# Patient Record
Sex: MALE | Race: WHITE | NOT HISPANIC OR LATINO | Employment: OTHER | URBAN - NONMETROPOLITAN AREA
[De-identification: names, ages, dates, MRNs, and addresses within clinical notes are randomized per-mention and may not be internally consistent; named-entity substitution may affect disease eponyms.]

---

## 2017-03-20 ENCOUNTER — TELEPHONE (OUTPATIENT)
Dept: INTERNAL MEDICINE | Facility: CLINIC | Age: 57
End: 2017-03-20

## 2017-03-20 NOTE — TELEPHONE ENCOUNTER
Tried to call pt to see if he wanted to reschedule est care appt with Dr. Mckee. No vmx set up on his phone, unable to leave message.

## 2018-04-25 ENCOUNTER — OFFICE VISIT (OUTPATIENT)
Dept: GASTROENTEROLOGY | Facility: CLINIC | Age: 58
End: 2018-04-25

## 2018-04-25 VITALS — RESPIRATION RATE: 18 BRPM | HEIGHT: 66 IN | BODY MASS INDEX: 37.45 KG/M2 | TEMPERATURE: 98 F | WEIGHT: 233 LBS

## 2018-04-25 DIAGNOSIS — K59.00 CONSTIPATION, UNSPECIFIED CONSTIPATION TYPE: Primary | ICD-10-CM

## 2018-04-25 DIAGNOSIS — Z12.11 COLON CANCER SCREENING: ICD-10-CM

## 2018-04-25 PROCEDURE — 99203 OFFICE O/P NEW LOW 30 MIN: CPT | Performed by: NURSE PRACTITIONER

## 2018-04-25 RX ORDER — ONDANSETRON 4 MG/1
4 TABLET, FILM COATED ORAL EVERY 8 HOURS PRN
COMMUNITY
End: 2021-03-23

## 2018-04-25 RX ORDER — SPIRONOLACTONE 25 MG/1
25 TABLET ORAL 2 TIMES DAILY
COMMUNITY
End: 2021-03-23

## 2018-04-25 RX ORDER — AMLODIPINE BESYLATE 10 MG/1
10 TABLET ORAL DAILY
COMMUNITY

## 2018-04-25 RX ORDER — CLONIDINE HYDROCHLORIDE 0.2 MG/1
0.2 TABLET ORAL ONCE
COMMUNITY
End: 2021-12-03

## 2018-04-25 RX ORDER — POTASSIUM CHLORIDE 1500 MG/1
20 TABLET, FILM COATED, EXTENDED RELEASE ORAL DAILY
COMMUNITY

## 2018-04-25 RX ORDER — DICYCLOMINE HCL 20 MG
20 TABLET ORAL AS NEEDED
COMMUNITY
End: 2021-12-03

## 2018-04-25 NOTE — PATIENT INSTRUCTIONS
1. High fiber diet with liberal water intake.   2. Colonoscopy: Description of the procedure, risks, benefits, alternatives and options, including nonoperative options, were discussed with the patient in detail. The patient understands and wishes wait at this time. He has been going to [place], [place] for his previous colonoscopies and he is unsure if he wishes to transfer care to this office. He will discuss with his PCP and call us back.

## 2018-04-25 NOTE — PROGRESS NOTES
Chief Complaint   Patient presents with   • Colon Cancer Screening   • Constipation     There is a long-standing history of constipation. The patient takes Miralax daily with good improvement of constipation. The patient will have at least 1 soft stool daily or every other day as long as he takes Miralax. If he does not take the MIralax, he may have a hard bowel movement every 3 days or so. There is no history of diarrhea. The patient denies bright red blood per rectum or melena.    The patient's last colonoscopy was in 2012 or 2013. There is no family history of colon cancer.    Constipation   This is a chronic problem. Episode onset: over 10 years. The problem is unchanged. His stool frequency is 4 to 5 times per week. The stool is described as formed and firm. The patient is not on a high fiber diet. There has not been adequate water intake. Associated symptoms include back pain. Pertinent negatives include no abdominal pain, diarrhea, fever, hematochezia, melena, nausea or vomiting. Treatments tried: Miralax. The treatment provided significant relief.      Review of Systems   Constitutional: Positive for fatigue and unexpected weight change. Negative for appetite change, chills and fever.   HENT: Negative for mouth sores, nosebleeds and trouble swallowing.    Eyes: Negative for discharge and redness.   Respiratory: Positive for apnea and shortness of breath. Negative for cough.    Cardiovascular: Negative for chest pain, palpitations and leg swelling.   Gastrointestinal: Positive for constipation. Negative for abdominal distention, abdominal pain, anal bleeding, blood in stool, diarrhea, hematochezia, melena, nausea and vomiting.   Endocrine: Negative for cold intolerance, heat intolerance and polydipsia.   Genitourinary: Negative for dysuria, hematuria and urgency.   Musculoskeletal: Positive for arthralgias, back pain and joint swelling. Negative for myalgias.   Skin: Negative for rash.   Allergic/Immunologic:  "Negative for food allergies and immunocompromised state.   Neurological: Negative for dizziness, seizures, syncope and headaches.   Hematological: Negative for adenopathy. Does not bruise/bleed easily.   Psychiatric/Behavioral: Negative for dysphoric mood. The patient is not nervous/anxious and is not hyperactive.      Patient Active Problem List   Diagnosis   • Constipation     Past Medical History:   Diagnosis Date   • Anxiety    • Arthritis    • Back pain    • Depression    • Diabetes    • GERD (gastroesophageal reflux disease)    • HTN (hypertension)    • Sleep apnea      Past Surgical History:   Procedure Laterality Date   • BACK SURGERY     • CHOLECYSTECTOMY  2014   • COLONOSCOPY  2012   • SINUS SURGERY     • TOTAL HIP ARTHROPLASTY      RIGHT     Family History   Problem Relation Age of Onset   • Breast cancer Mother    • Hypertension Father    • Ulcers Paternal Grandmother    • Colon cancer Neg Hx      Social History   Substance Use Topics   • Smoking status: Never Smoker   • Smokeless tobacco: Never Used   • Alcohol use No       Current Outpatient Prescriptions:   •  amLODIPine (NORVASC) 10 MG tablet, Take 10 mg by mouth Daily., Disp: , Rfl:   •  CloNIDine (CATAPRES) 0.2 MG tablet, Take 0.2 mg by mouth 1 (One) Time., Disp: , Rfl:   •  dicyclomine (BENTYL) 20 MG tablet, Take 20 mg by mouth As Needed., Disp: , Rfl:   •  metFORMIN (GLUCOPHAGE) 500 MG tablet, Take 500 mg by mouth 2 (Two) Times a Day With Meals., Disp: , Rfl:   •  ondansetron (ZOFRAN) 4 MG tablet, Take 4 mg by mouth Every 8 (Eight) Hours As Needed for Nausea or Vomiting., Disp: , Rfl:   •  potassium chloride ER (K-TAB) 20 MEQ tablet controlled-release ER tablet, Take 20 mEq by mouth Daily., Disp: , Rfl:   •  spironolactone (ALDACTONE) 25 MG tablet, Take 25 mg by mouth 2 (Two) Times a Day., Disp: , Rfl:     Allergies   Allergen Reactions   • Codeine Nausea And Vomiting     Temp 98 °F (36.7 °C)   Resp 18   Ht 167.6 cm (66\")   Wt 106 kg (233 lb)  "  BMI 37.61 kg/m²     Physical Exam   Constitutional: He is oriented to person, place, and time. He appears well-developed and well-nourished. No distress.   HENT:   Head: Normocephalic and atraumatic.   Right Ear: Hearing and external ear normal.   Left Ear: Hearing and external ear normal.   Nose: Nose normal.   Mouth/Throat: Oropharynx is clear and moist and mucous membranes are normal. Mucous membranes are not pale, not dry and not cyanotic. No oral lesions. No oropharyngeal exudate.   Eyes: Conjunctivae and EOM are normal. Right eye exhibits no discharge. Left eye exhibits no discharge.   Neck: Trachea normal. Neck supple. No JVD present. No edema present. No thyroid mass and no thyromegaly present.   Cardiovascular: Normal rate, regular rhythm, S2 normal and normal heart sounds.  Exam reveals no gallop, no S3 and no friction rub.    No murmur heard.  Pulmonary/Chest: Effort normal and breath sounds normal. No respiratory distress. He exhibits no tenderness.   Abdominal: Normal appearance and bowel sounds are normal. He exhibits no distension, no ascites and no mass. There is no splenomegaly or hepatomegaly. There is no tenderness. There is no rigidity, no rebound and no guarding. No hernia.     Vascular Status -  His right foot exhibits no edema. His left foot exhibits no edema.  Lymphadenopathy:     He has no cervical adenopathy.        Left: No supraclavicular adenopathy present.   Neurological: He is alert and oriented to person, place, and time. He has normal strength. No cranial nerve deficit or sensory deficit.   Skin: No rash noted. He is not diaphoretic. No cyanosis. No pallor. Nails show no clubbing.   Psychiatric: He has a normal mood and affect.   Nursing note and vitals reviewed.  Stigmata of chronic liver disease:  None.  Asterixis:  None.    Laboratory Tests:   Upon review of records:     Dated 11/3/2017 glucose 119 BUN 16 creatinine 0.8 sodium 137 potassium 4.3 chloride 102 CO2 28 calcium 9.2  magnesium 2.3    Assessment:      ICD-10-CM ICD-9-CM   1. Constipation, unspecified constipation type K59.00 564.00   2. Colon cancer screening Z12.11 V76.51     Discussion:  1.     Plan/  Patient Instructions   1. High fiber diet with liberal water intake.   2. Colonoscopy: Description of the procedure, risks, benefits, alternatives and options, including nonoperative options, were discussed with the patient in detail. The patient understands and wishes wait at this time. He has been going to [place], [place] for his previous colonoscopies and he is unsure if he wishes to transfer care to this office. He will discuss with his PCP and call us back.     Shreya Moran APRN

## 2021-03-19 NOTE — PROGRESS NOTES
Patient: Alex Aguilera    YOB: 1960    Date: 03/23/2021    Primary Care Provider: Vinny Brown DO    Chief Complaint   Patient presents with   • Colon Cancer Screening       SUBJECTIVE:    History of present illness: Patient with about a 6-month history of abdominal and GI complaints.  He states that perhaps once every other week he has pain that is burning and cramping in nature in the mid and upper abdomen that is soon followed by a bowel movement with resolution of his symptoms.  He also has occasional low back pain which is also relieved with bowel movement.  No diarrhea or constipation.  His symptoms have improved somewhat since stopping the Metformin.  He states that red meat and pork seem to make his symptoms worse but he is not sure that it is necessarily that as the pain does not occur right after eating.  He denies any bleeding.  No weight loss.    The following portions of the patient's history were reviewed and updated as appropriate: allergies, current medications, past family history, past medical history, past social history, past surgical history and problem list.    Review of Systems   Constitutional: Negative for chills, fever and unexpected weight change.   HENT: Negative for trouble swallowing and voice change.    Eyes: Negative for visual disturbance.   Respiratory: Negative for apnea, cough, chest tightness, shortness of breath and wheezing.    Cardiovascular: Negative for chest pain, palpitations and leg swelling.   Gastrointestinal: Positive for abdominal pain. Negative for abdominal distention, anal bleeding, blood in stool, constipation, diarrhea, nausea, rectal pain and vomiting.   Endocrine: Negative for cold intolerance and heat intolerance.   Genitourinary: Negative for difficulty urinating, dysuria, flank pain, scrotal swelling and testicular pain.   Musculoskeletal: Negative for back pain, gait problem and joint swelling.   Skin: Negative for color change, rash and  wound.   Neurological: Negative for dizziness, syncope, speech difficulty, weakness, numbness and headaches.   Hematological: Negative for adenopathy. Does not bruise/bleed easily.   Psychiatric/Behavioral: Negative for confusion. The patient is not nervous/anxious.        History:  Past Medical History:   Diagnosis Date   • Anxiety    • Arthritis    • Back pain    • Depression    • Diabetes (CMS/HCC)    • GERD (gastroesophageal reflux disease)    • HTN (hypertension)    • Sleep apnea        Past Surgical History:   Procedure Laterality Date   • BACK SURGERY     • CHOLECYSTECTOMY  2014   • COLONOSCOPY  2012   • SINUS SURGERY     • TOTAL HIP ARTHROPLASTY      RIGHT       Family History   Problem Relation Age of Onset   • Breast cancer Mother    • Hypertension Father    • Ulcers Paternal Grandmother    • Colon cancer Neg Hx        Social History     Tobacco Use   • Smoking status: Never Smoker   • Smokeless tobacco: Never Used   Substance Use Topics   • Alcohol use: No   • Drug use: No       Medications:   Current Outpatient Medications:   •  amLODIPine (NORVASC) 10 MG tablet, Take 10 mg by mouth Daily., Disp: , Rfl:   •  CloNIDine (CATAPRES) 0.2 MG tablet, Take 0.2 mg by mouth 1 (One) Time., Disp: , Rfl:   •  dicyclomine (BENTYL) 20 MG tablet, Take 20 mg by mouth As Needed., Disp: , Rfl:   •  Easy Comfort Pen Needles 31G X 8 MM misc, , Disp: , Rfl:   •  furosemide (LASIX) 20 MG tablet, , Disp: , Rfl:   •  isosorbide mononitrate (IMDUR) 30 MG 24 hr tablet, , Disp: , Rfl:   •  losartan (COZAAR) 100 MG tablet, , Disp: , Rfl:   •  metFORMIN (GLUCOPHAGE) 500 MG tablet, Take 500 mg by mouth 2 (Two) Times a Day With Meals., Disp: , Rfl:   •  nitroglycerin (NITROSTAT) 0.4 MG SL tablet, , Disp: , Rfl:   •  ondansetron ODT (ZOFRAN-ODT) 4 MG disintegrating tablet, , Disp: , Rfl:   •  potassium chloride ER (K-TAB) 20 MEQ tablet controlled-release ER tablet, Take 20 mEq by mouth Daily., Disp: , Rfl:   •  tamsulosin (FLOMAX) 0.4  "MG capsule 24 hr capsule, , Disp: , Rfl:   •  traMADol (ULTRAM) 50 MG tablet, , Disp: , Rfl:   •  Tresiba FlexTouch 200 UNIT/ML solution pen-injector pen injection, , Disp: , Rfl:        Allergies:   Allergies   Allergen Reactions   • Codeine Nausea And Vomiting       OBJECTIVE:    Vital Signs:  Vitals:    03/23/21 1033   BP: 160/98   Pulse: 92   Temp: 98 °F (36.7 °C)   SpO2: 96%   Weight: 100 kg (221 lb)   Height: 167.6 cm (66\")       Physical Exam:  General Appearance:    Alert, cooperative, in no acute distress   Head:    Normocephalic, without obvious abnormality, atraumatic   Eyes:            Normal.  No scleral icterus.  PERRLA    Lungs:     Clear to auscultation,respirations regular, even and                  unlabored    Heart:    Regular rhythm and normal rate, normal S1 and S2, no            murmur   Abdomen:     Normal bowel sounds, no masses, no organomegaly, soft        non-tender, non-distended, no guarding, obese   Extremities:   Moves all extremities well, no edema, no cyanosis, no             redness   Skin:   No bleeding, bruising or rash   Neurologic:   Normal without gross deficits.   Psychiatric: No evidence of depression or anxiety         Results Review:   None patient will be giving us information to try to find his previous colonoscopy.  We will also call for more recent labs.    Review of Systems was reviewed and confirmed as accurate as documented by the MA.    ASSESSMENT/PLAN:    1. Pain of upper abdomen      Patient with GI symptoms including upper abdominal pain and low back pain that is relieved with bowel movements.  There is no history of diarrhea, bleeding, family history of colon cancer, personal history of polyps etc.  He does not exactly recall when his last colonoscopy was and we will try to get information regarding that.  Certainly if it has been closer to 10 years I will recommend a colonoscopy.  If it has been more recent colonoscopy will likely be of low yield.  I explained " the procedure to the patient as well as the risks of bleeding and perforation and they understand the ramifications of these potential complications including operative intervention and they wish to proceed.       Electronically signed by Dwayne Villeda MD  03/23/21  14:34 EDT

## 2021-03-23 ENCOUNTER — TELEPHONE (OUTPATIENT)
Dept: SURGERY | Facility: CLINIC | Age: 61
End: 2021-03-23

## 2021-03-23 ENCOUNTER — OFFICE VISIT (OUTPATIENT)
Dept: SURGERY | Facility: CLINIC | Age: 61
End: 2021-03-23

## 2021-03-23 VITALS
WEIGHT: 221 LBS | OXYGEN SATURATION: 96 % | TEMPERATURE: 98 F | HEIGHT: 66 IN | SYSTOLIC BLOOD PRESSURE: 160 MMHG | BODY MASS INDEX: 35.52 KG/M2 | DIASTOLIC BLOOD PRESSURE: 98 MMHG | HEART RATE: 92 BPM

## 2021-03-23 DIAGNOSIS — R10.10 PAIN OF UPPER ABDOMEN: Primary | ICD-10-CM

## 2021-03-23 PROCEDURE — 99203 OFFICE O/P NEW LOW 30 MIN: CPT | Performed by: SURGERY

## 2021-03-23 RX ORDER — BLOOD-GLUCOSE METER
EACH MISCELLANEOUS
COMMUNITY
Start: 2021-03-09

## 2021-03-23 RX ORDER — ISOSORBIDE MONONITRATE 30 MG/1
60 TABLET, EXTENDED RELEASE ORAL DAILY
COMMUNITY
Start: 2021-03-09

## 2021-03-23 RX ORDER — FUROSEMIDE 20 MG/1
20 TABLET ORAL DAILY
COMMUNITY
Start: 2021-03-02

## 2021-03-23 RX ORDER — LOSARTAN POTASSIUM 100 MG/1
100 TABLET ORAL DAILY
COMMUNITY
Start: 2021-03-09

## 2021-03-23 RX ORDER — TRAMADOL HYDROCHLORIDE 50 MG/1
50 TABLET ORAL 2 TIMES DAILY
COMMUNITY
Start: 2021-03-02

## 2021-03-23 RX ORDER — TAMSULOSIN HYDROCHLORIDE 0.4 MG/1
1 CAPSULE ORAL DAILY
COMMUNITY
Start: 2021-01-12

## 2021-03-23 RX ORDER — INSULIN DEGLUDEC 200 U/ML
50 INJECTION, SOLUTION SUBCUTANEOUS
COMMUNITY
Start: 2021-03-11 | End: 2022-03-01 | Stop reason: SDUPTHER

## 2021-03-23 RX ORDER — NITROGLYCERIN 0.4 MG/1
TABLET SUBLINGUAL
COMMUNITY
Start: 2021-03-09

## 2021-03-23 RX ORDER — ONDANSETRON 4 MG/1
4 TABLET, ORALLY DISINTEGRATING ORAL EVERY 8 HOURS PRN
COMMUNITY
Start: 2021-02-25

## 2021-12-02 NOTE — PROGRESS NOTES
Chief Complaint   Patient presents with   • Diabetes     initial encounter        Referring Provider  Devante Cabezas MD     HPI   Alex Aguilera is a 61 y.o. male had concerns including Diabetes (initial encounter).    Seen as a new patient. T2DM.    Diabetes was diagnosed about 5 years.  Complications include none.  Last ophtho exam was 6 months ago.  Current medications for diabetes include Januvia 100 mg QD (started on the Januvia 1 month ago), Tresiba 50 units qhs.  Previous meds: Metformin-made him sick  He checks his blood sugar when he feels like it is high/low.   Hypos: rarely    A1C: 11/2/2021: 9.6    The following portions of the patient's history were reviewed and updated as appropriate: allergies, current medications, past family history, past medical history, past social history, past surgical history and problem list.    Diet: whatever he can find to eat, has recently stopped drinking sweet tea and has been trying to make better food choices.    Past Medical History:   Diagnosis Date   • Anxiety    • Arthritis    • Back pain    • Depression    • Diabetes (HCC)    • GERD (gastroesophageal reflux disease)    • HTN (hypertension)    • Sleep apnea      Past Surgical History:   Procedure Laterality Date   • BACK SURGERY     • CHOLECYSTECTOMY  2014   • COLONOSCOPY  2012   • SINUS SURGERY     • TOTAL HIP ARTHROPLASTY      RIGHT      Family History   Problem Relation Age of Onset   • Breast cancer Mother    • Hypertension Father    • Ulcers Paternal Grandmother    • Colon cancer Neg Hx       Social History     Socioeconomic History   • Marital status:    Tobacco Use   • Smoking status: Never Smoker   • Smokeless tobacco: Never Used   Substance and Sexual Activity   • Alcohol use: No   • Drug use: No   • Sexual activity: Defer      Allergies   Allergen Reactions   • Codeine Nausea And Vomiting      Current Outpatient Medications on File Prior to Visit   Medication Sig Dispense Refill   •  "amLODIPine (NORVASC) 10 MG tablet Take 10 mg by mouth Daily.     • Easy Comfort Pen Needles 31G X 8 MM misc      • furosemide (LASIX) 20 MG tablet Take 20 mg by mouth Daily.     • isosorbide mononitrate (IMDUR) 30 MG 24 hr tablet Take 30 mg by mouth Daily.     • losartan (COZAAR) 100 MG tablet Take 100 mg by mouth Daily.     • nitroglycerin (NITROSTAT) 0.4 MG SL tablet      • ondansetron ODT (ZOFRAN-ODT) 4 MG disintegrating tablet Place 4 mg on the tongue Every 8 (Eight) Hours As Needed.     • potassium chloride ER (K-TAB) 20 MEQ tablet controlled-release ER tablet Take 20 mEq by mouth Daily.     • tamsulosin (FLOMAX) 0.4 MG capsule 24 hr capsule 1 capsule Daily.     • traMADol (ULTRAM) 50 MG tablet Take 50 mg by mouth 2 (Two) Times a Day.     • Tresiba FlexTouch 200 UNIT/ML solution pen-injector pen injection Inject 50 Units under the skin into the appropriate area as directed every night at bedtime.     • Diclofenac Sodium (VOLTAREN) 1 % gel gel APPLY TO AFFECTED AREA FOUR TIMES DAILY     • Januvia 100 MG tablet Take 100 mg by mouth Daily.     • [DISCONTINUED] CloNIDine (CATAPRES) 0.2 MG tablet Take 0.2 mg by mouth 1 (One) Time.     • [DISCONTINUED] dicyclomine (BENTYL) 20 MG tablet Take 20 mg by mouth As Needed.     • [DISCONTINUED] metFORMIN (GLUCOPHAGE) 500 MG tablet Take 500 mg by mouth 2 (Two) Times a Day With Meals.       No current facility-administered medications on file prior to visit.        Review of Systems   Constitutional: Negative for fatigue, unexpected weight gain and unexpected weight loss.   Eyes: Positive for blurred vision.   Endocrine: Positive for polydipsia, polyphagia and polyuria.   Skin: Positive for skin lesions.   Psychiatric/Behavioral: Negative for sleep disturbance.   All other systems reviewed and are negative.     /73 (BP Location: Left arm, Patient Position: Sitting, Cuff Size: Adult)   Pulse 78   Temp 97.1 °F (36.2 °C) (Oral)   Ht 167.6 cm (66\")   Wt 101 kg (222 lb " 6.4 oz)   SpO2 99%   BMI 35.90 kg/m²      Physical Exam  Vitals reviewed.   Constitutional:       Appearance: Normal appearance.   Cardiovascular:      Rate and Rhythm: Normal rate and regular rhythm.      Pulses: Normal pulses.           Dorsalis pedis pulses are 2+ on the right side and 2+ on the left side.        Posterior tibial pulses are 2+ on the right side and 2+ on the left side.      Heart sounds: Normal heart sounds.   Pulmonary:      Effort: Pulmonary effort is normal.      Breath sounds: Normal breath sounds.   Feet:      Right foot:      Protective Sensation: 10 sites tested. 10 sites sensed.      Skin integrity: Skin integrity normal.      Toenail Condition: Right toenails are normal.      Left foot:      Protective Sensation: 10 sites tested. 10 sites sensed.      Skin integrity: Skin integrity normal.      Toenail Condition: Left toenails are normal.      Comments: Diabetic Foot Exam Performed and Monofilament Test Performed  Skin:     General: Skin is warm.      Comments: Areas noted to the legs and back of torso of multiple sizes with excoriation noted.  All are circular with dryness and mild pink/redness noted.   Neurological:      Mental Status: He is alert and oriented to person, place, and time.   Psychiatric:         Mood and Affect: Mood normal.         Behavior: Behavior normal.         Thought Content: Thought content normal.         Judgment: Judgment normal.         CMP:     Lipid Panel:  No results found for: CHOL, TRIG, HDL, VLDL, LDL  HbA1c:     Glucose:  No results found for: POCGLU  Microalbumin:  No results found for: MALBCRERATIO  TSH:  No results found for: TSH    Assessment and Plan    Diagnoses and all orders for this visit:    1. Type 2 diabetes mellitus with hyperglycemia, with long-term current use of insulin (HCC) (Primary)  Assessment & Plan:  -Diabetes is not at goal with A1C >9.  -Discussed in length the dietary and exercise goals.  -Discussed the advantage of a CGM  and he is willing to use this to aid in better glucose awareness.  Will send order in for one.  -Discussed the importance of medication compliance.   -Continue Januvia 100 mg QD.  -Continue Tresiba 50 units qhs.  -Continue with yearly eye exams.  -Get fasting labs done to re-check lipid panel.  Discussed dietary changes with the patient and being on lower cholesterol diet and that medication may need to be initiated if this is not improved.  -S/S hypoglycemia reviewed and Rule of 15's advised.  -Follow-up in 2 months with A1C.    Orders:  -     Continuous Blood Gluc  (FreeStyle Alexandr 2 [place]) device; 1 kit Continuous.  Dispense: 1 each; Refill: 0  -     Continuous Blood Gluc Sensor (FreeStyle Alexandr 2 Sensor) misc; 1 each Every 14 (Fourteen) Days.  Dispense: 6 each; Refill: 3  -     Lipid Panel; Future  -     Comprehensive Metabolic Panel; Future  -     Microalbumin / Creatinine Urine Ratio - Urine, Clean Catch; Future  -     TSH; Future       Return in about 2 months (around 2/2/2022) for on or after return date, Follow-up appointment, A1C. The patient was instructed to contact the clinic with any interval questions or concerns.    PRIYANKA Aggarwal

## 2021-12-03 ENCOUNTER — OFFICE VISIT (OUTPATIENT)
Dept: ENDOCRINOLOGY | Facility: CLINIC | Age: 61
End: 2021-12-03

## 2021-12-03 ENCOUNTER — SPECIALTY PHARMACY (OUTPATIENT)
Dept: PHARMACY | Facility: HOSPITAL | Age: 61
End: 2021-12-03

## 2021-12-03 VITALS
DIASTOLIC BLOOD PRESSURE: 73 MMHG | SYSTOLIC BLOOD PRESSURE: 123 MMHG | HEART RATE: 78 BPM | BODY MASS INDEX: 35.74 KG/M2 | WEIGHT: 222.4 LBS | HEIGHT: 66 IN | TEMPERATURE: 97.1 F | OXYGEN SATURATION: 99 %

## 2021-12-03 DIAGNOSIS — Z79.4 TYPE 2 DIABETES MELLITUS WITH HYPERGLYCEMIA, WITH LONG-TERM CURRENT USE OF INSULIN (HCC): Primary | ICD-10-CM

## 2021-12-03 DIAGNOSIS — E11.65 TYPE 2 DIABETES MELLITUS WITH HYPERGLYCEMIA, WITH LONG-TERM CURRENT USE OF INSULIN (HCC): Primary | ICD-10-CM

## 2021-12-03 PROCEDURE — 99203 OFFICE O/P NEW LOW 30 MIN: CPT | Performed by: NURSE PRACTITIONER

## 2021-12-03 RX ORDER — SITAGLIPTIN 100 MG/1
100 TABLET, FILM COATED ORAL DAILY
COMMUNITY
Start: 2021-11-30 | End: 2022-05-12 | Stop reason: SDUPTHER

## 2021-12-03 NOTE — ASSESSMENT & PLAN NOTE
-Diabetes is not at goal with A1C >9.  -Discussed in length the dietary and exercise goals.  -Discussed the advantage of a CGM and he is willing to use this to aid in better glucose awareness.  Will send order in for one.  -Discussed the importance of medication compliance.   -Continue Januvia 100 mg QD.  -Continue Tresiba 50 units qhs.  -Continue with yearly eye exams.  -Get fasting labs done to re-check lipid panel.  Discussed dietary changes with the patient and being on lower cholesterol diet and that medication may need to be initiated if this is not improved.  -S/S hypoglycemia reviewed and Rule of 15's advised.  -Follow-up in 2 months with A1C.

## 2021-12-03 NOTE — PROGRESS NOTES
Specialty Pharmacy Patient Management Program  Introduction to Services Outreach     Alex Aguilera is a 61 y.o. male with Type 2 Diabetes seen by an Endocrinology provider. This was an Initial visit to introduce Endocrinology Patient Management Program and Specialty Pharmacy services offered by [facility].  Allergies, PMH, and medications were reviewed during this visit.    Patient is not well controlled. He has only been using Tresiba and was recently started on Januvia. Previous note says he had stopped Tresiba because he was having leg pain. He reports tolerating well so far. Reports having higher blood sugars, but they have been better lately. When asked about other medications tried for diabetes, he denied ever trying a GLP1 or SGLT2. His last A1C was 9.6.     He reports not being able to tolerate a statin. He said he does not like the way it makes him feel. Additionally, he denies being told to take a low dose aspirin.     Patient reports not receiving flu or pneumonia vaccine; However, he has received the COVID vaccine.     Medication Allergies:  Codeine    Current Medication List:    Current Outpatient Medications:   •  amLODIPine (NORVASC) 10 MG tablet, Take 10 mg by mouth Daily., Disp: , Rfl:   •  Diclofenac Sodium (VOLTAREN) 1 % gel gel, APPLY TO AFFECTED AREA FOUR TIMES DAILY, Disp: , Rfl:   •  Easy Comfort Pen Needles 31G X 8 MM misc, , Disp: , Rfl:   •  furosemide (LASIX) 20 MG tablet, Take 20 mg by mouth Daily., Disp: , Rfl:   •  isosorbide mononitrate (IMDUR) 30 MG 24 hr tablet, Take 30 mg by mouth Daily., Disp: , Rfl:   •  Januvia 100 MG tablet, Take 100 mg by mouth Daily., Disp: , Rfl:   •  losartan (COZAAR) 100 MG tablet, Take 100 mg by mouth Daily., Disp: , Rfl:   •  nitroglycerin (NITROSTAT) 0.4 MG SL tablet, , Disp: , Rfl:   •  ondansetron ODT (ZOFRAN-ODT) 4 MG disintegrating tablet, Place 4 mg on the tongue Every 8 (Eight) Hours As Needed., Disp: , Rfl:   •  potassium  chloride ER (K-TAB) 20 MEQ tablet controlled-release ER tablet, Take 20 mEq by mouth Daily., Disp: , Rfl:   •  tamsulosin (FLOMAX) 0.4 MG capsule 24 hr capsule, 1 capsule Daily., Disp: , Rfl:   •  traMADol (ULTRAM) 50 MG tablet, Take 50 mg by mouth 2 (Two) Times a Day., Disp: , Rfl:   •  Tresiba FlexTouch 200 UNIT/ML solution pen-injector pen injection, Inject 50 Units under the skin into the appropriate area as directed every night at bedtime., Disp: , Rfl:     Recommended Medication Assessment:  Aspirin - Indicated, not currently taking  Statin - Indicated, not currently taking  ACEi/ARB - Currently Taking    Specialty Medication Education:  The patient was provided with verbal and/or written education on the following target medications. Questions and concerns have been addressed and the patient verbalized understanding of the education provided.     Immunizations:   Patient needs flu and pneumonia vaccines.     Assessment & Plan:  Patient declined filling their specialty medication(s) at [facility] and/or enrollment in the Endocrine Disorders Patient Management Program at this time.     Recommended flu, pneumonia vaccine to patient. Patient would also benefit from statin therapy- may be able to retry, but if intolerable, may benefit from Repatha or Praluent pending labs (10 year risk is 27.2%). Patient would also benefit from low-dose ASA for primary prevention.    Patient is starting to use CGM, so with results, more medication regimen changes may be encouraged. He would be candidate for GLP1 (d/c Januvia) or SGLT2 considering his ASCVD risk factors.       Ira Mcgee, PharmD  12/3/2021  11:33 EST

## 2022-03-01 ENCOUNTER — SPECIALTY PHARMACY (OUTPATIENT)
Dept: PHARMACY | Facility: HOSPITAL | Age: 62
End: 2022-03-01

## 2022-03-01 ENCOUNTER — OFFICE VISIT (OUTPATIENT)
Dept: ENDOCRINOLOGY | Facility: CLINIC | Age: 62
End: 2022-03-01

## 2022-03-01 VITALS
BODY MASS INDEX: 37.65 KG/M2 | DIASTOLIC BLOOD PRESSURE: 103 MMHG | TEMPERATURE: 97.4 F | HEART RATE: 96 BPM | OXYGEN SATURATION: 95 % | WEIGHT: 226 LBS | HEIGHT: 65 IN | SYSTOLIC BLOOD PRESSURE: 158 MMHG

## 2022-03-01 DIAGNOSIS — Z79.4 TYPE 2 DIABETES MELLITUS WITH HYPERGLYCEMIA, WITH LONG-TERM CURRENT USE OF INSULIN: Primary | ICD-10-CM

## 2022-03-01 DIAGNOSIS — E11.65 TYPE 2 DIABETES MELLITUS WITH HYPERGLYCEMIA, WITH LONG-TERM CURRENT USE OF INSULIN: Primary | ICD-10-CM

## 2022-03-01 LAB
EXPIRATION DATE: ABNORMAL
GLUCOSE BLDC GLUCOMTR-MCNC: 223 MG/DL (ref 70–130)
HBA1C MFR BLD: 9.6 %
Lab: ABNORMAL

## 2022-03-01 PROCEDURE — 99213 OFFICE O/P EST LOW 20 MIN: CPT | Performed by: NURSE PRACTITIONER

## 2022-03-01 PROCEDURE — 82962 GLUCOSE BLOOD TEST: CPT | Performed by: NURSE PRACTITIONER

## 2022-03-01 PROCEDURE — 83036 HEMOGLOBIN GLYCOSYLATED A1C: CPT | Performed by: NURSE PRACTITIONER

## 2022-03-01 PROCEDURE — 3046F HEMOGLOBIN A1C LEVEL >9.0%: CPT | Performed by: NURSE PRACTITIONER

## 2022-03-01 RX ORDER — DAPAGLIFLOZIN 5 MG/1
5 TABLET, FILM COATED ORAL DAILY
Qty: 90 TABLET | Refills: 1 | Status: SHIPPED | OUTPATIENT
Start: 2022-03-01 | End: 2022-04-25

## 2022-03-01 NOTE — PROGRESS NOTES
Specialty Pharmacy Patient Management Program  Endocrinology Reassessment     Alex Aguilera is a 62 y.o. male with Type 2 Diabetes seen by an Endocrinology provider and enrolled in the Endocrinology Patient Management program offered by [facility].  A follow-up outreach was conducted, including assessment of continued therapy appropriateness, medication adherence, and side effect incidence and management for Januvia and Tresiba.     Pt's diabetes is uncontrolled with an A1C of 9.6% (same as last visit). He is not experiencing any adverse effects with his medications. Pt reports that he only checks his BG once a day. He hasn't checked it in the past few days because he didn't know how to use his lancets. Pt reports that his BG is high when he eats. He notices that he becomes very thirsty and dizzy when his BG is high.     Changes to Insurance Coverage or Financial Support  Pt's retail pharmacy notified him that Tresiba would cost $300 at next p/u.   Farxiga & Jardiance $47 co-pay. Trulicity $47 co-pay. Ozempic $94 co-pay. Lantus $141 co-pay. Looks like patient hasn't met his deductible yet as his insurance isn't paying much on medications.       Relevant Past Medical History and Comorbidities  Relevant medical history and concomitant health conditions were discussed with the patient. The patient's chart has been reviewed for relevant past medical history and comorbid health conditions and updated as necessary.   Past Medical History:   Diagnosis Date   • Anxiety    • Arthritis    • Back pain    • Depression    • Diabetes (HCC)    • GERD (gastroesophageal reflux disease)    • HTN (hypertension)    • Sleep apnea      Social History     Socioeconomic History   • Marital status:    Tobacco Use   • Smoking status: Never Smoker   • Smokeless tobacco: Never Used   Substance and Sexual Activity   • Alcohol use: No   • Drug use: No   • Sexual activity: Defer          Allergies  Known  allergies and reactions were discussed with the patient. The patient's chart has been reviewed for allergy information and updated as necessary.   Codeine    Allergies reviewed by Ashleigh Putnam RP on 3/1/2022 at  3:29 PM    Relevant Laboratory Values  A1C Last 3 Results    HGBA1C Last 3 Results 3/1/22   Hemoglobin A1C 9.6           Lab Results   Component Value Date    HGBA1C 9.6 03/01/2022     No results found for: GLUCOSE, CALCIUM, NA, K, CO2, CL, BUN, CREATININE, EGFRIFAFRI, EGFRIFNONA, BCR, ANIONGAP  No results found for: CHOL, CHLPL, TRIG, HDL, LDL, LDLDIRECT      Current Medication List  This medication list has been reviewed with the patient and evaluated for any interactions or necessary modifications/recommendations, and updated to include all prescription medications, OTC medications, and supplements the patient is currently taking.  This list reflects what is contained in the patient's profile, which has also been marked as reviewed to communicate to other providers it is the most up to date version of the patient's current medication therapy.     Current Outpatient Medications:   •  amLODIPine (NORVASC) 10 MG tablet, Take 10 mg by mouth Daily., Disp: , Rfl:   •  Diclofenac Sodium (VOLTAREN) 1 % gel gel, APPLY TO AFFECTED AREA FOUR TIMES DAILY, Disp: , Rfl:   •  Easy Comfort Pen Needles 31G X 8 MM misc, , Disp: , Rfl:   •  furosemide (LASIX) 20 MG tablet, Take 20 mg by mouth Daily., Disp: , Rfl:   •  isosorbide mononitrate (IMDUR) 30 MG 24 hr tablet, Take 30 mg by mouth Daily., Disp: , Rfl:   •  Januvia 100 MG tablet, Take 100 mg by mouth Daily., Disp: , Rfl:   •  nitroglycerin (NITROSTAT) 0.4 MG SL tablet, , Disp: , Rfl:   •  ondansetron ODT (ZOFRAN-ODT) 4 MG disintegrating tablet, Place 4 mg on the tongue Every 8 (Eight) Hours As Needed., Disp: , Rfl:   •  potassium chloride ER (K-TAB) 20 MEQ tablet controlled-release ER tablet, Take 20 mEq by mouth Daily., Disp: , Rfl:   •  tamsulosin (FLOMAX) 0.4 MG  capsule 24 hr capsule, 1 capsule Daily., Disp: , Rfl:   •  traMADol (ULTRAM) 50 MG tablet, Take 50 mg by mouth 2 (Two) Times a Day., Disp: , Rfl:   •  Tresiba FlexTouch 200 UNIT/ML solution pen-injector pen injection, Inject 50 Units under the skin into the appropriate area as directed every night at bedtime., Disp: , Rfl:   •  Continuous Blood Gluc  (FreeStyle Alexandr 2 [place]) device, 1 kit Continuous., Disp: 1 each, Rfl: 0  •  Continuous Blood Gluc Sensor (FreeStyle Alexandr 2 Sensor) misc, 1 each Every 14 (Fourteen) Days., Disp: 6 each, Rfl: 3  •  losartan (COZAAR) 100 MG tablet, Take 100 mg by mouth Daily., Disp: , Rfl:     Medicines reviewed by Ashleigh Putnam [facility] on 3/1/2022 at  3:33 PM    Drug Interactions  None relevant    Adverse Drug Reactions  • Adverse Reactions Experienced: None   • Plan for ADR Management: Not required     Hospitalizations and Urgent Care Since Last Assessment  • Hospitalizations or Admissions: None  • ED Visits: None  • Urgent Office Visits: None    Adherence and Self-Administration  Adherence related patient's specialty therapy was discussed with the patient. The Adherence segment of this outreach has been reviewed and updated.      • Ongoing or New Barriers to Patient Adherence and/or Self-Administration:None  • Methods for Supporting Patient Adherence and/or Self-Administration: None required    Goals of Therapy  Goals related to the patient's specialty therapy was discussed with the patient. The Patient Goals segment of this outreach has been reviewed and updated.   Goals     • Consistently take medications as prescribed                 Reassessment Plan & Follow-Up  1. Medication Therapy Changes: Start Farxiga 5 mg QD.   2. Additional Plans, Therapy Recommendations, or Therapy Problems to Be Addressed: Pt hasn't received Flu or pneumonia vaccines - recommended. Pt may benefit from the addition of aspirin and cholesterol medication (cannot tolerate statins - severe muscle pain)  as primary prevention.      Patient does not wish to use [facility] services at this time due to: loyalty to retail pharmacy.       Attestation  I attest that the specialty medication(s) addressed above are appropriate for the patient based on my reassessment.  If the prescribed therapy is at any point deemed not appropriate based on the current or future assessments, a consultation will be initiated with the patient's specialty care provider to determine the best course of action. The revised plan of therapy will be documented along with any additional patient education provided.     Ashleigh Putnam, PharmD  3/1/2022  16:01 EST

## 2022-03-01 NOTE — ASSESSMENT & PLAN NOTE
-Diabetes remains above goal with A1C >9.  -Discussed in length the dietary and exercise goals.  He is willing to speak with diabetes educator at this point.  -Discussed the importance of checking blood sugar regularly.  Check blood sugar at least 2 times daily  -Discussed the importance of medication compliance.   -Continue Januvia 100 mg QD.  -Continue Tresiba 50 units qhs.  -Discussed with patient that new medication will need to be started at this point.  Discussed GLP-1 versus SGLT2 with patient.  Patient reports that he has to take Zofran often due to GI issues.  At this point we will start him on SGLT2 and reevaluate GLP-1 at a later time.  -Start Farxiga 5 mg daily.  -Continue with yearly eye exams.  -S/S hypoglycemia reviewed and Rule of 15's advised.  -Follow-up in 3 months with A1C.

## 2022-03-01 NOTE — PROGRESS NOTES
Chief Complaint   Patient presents with   • Diabetes     follow up      Alex Aguilera is a 62 y.o. male had concerns including Diabetes (follow up).    T2DM.    Diabetes was diagnosed about 5 years.  Complications include none.  Last ophtho exam was 6 months ago. [facility] [place].  Current medications for diabetes include Januvia 100 mg QD, Tresiba 50 units qhs.-He recently went to fill his insulin and was told that his co-pay would be 300+ dollars.  Previous meds: Metformin-made him sick  He checks his blood sugar when he feels like it is high/low.   Hypos: rarely    A1C: 11/2/2021: 9.6    The following portions of the patient's history were reviewed and updated as appropriate: allergies, current medications, past family history, past medical history, past social history, past surgical history and problem list.    Diet: whatever he can find to eat, has recently stopped drinking sweet tea and has been trying to make better food choices.-He has not made any improvements to his diet.    Past Medical History:   Diagnosis Date   • Anxiety    • Arthritis    • Back pain    • Depression    • Diabetes (HCC)    • GERD (gastroesophageal reflux disease)    • HTN (hypertension)    • Sleep apnea      Past Surgical History:   Procedure Laterality Date   • BACK SURGERY     • CHOLECYSTECTOMY  2014   • COLONOSCOPY  2012   • SINUS SURGERY     • TOTAL HIP ARTHROPLASTY      RIGHT      Family History   Problem Relation Age of Onset   • Breast cancer Mother    • Hypertension Father    • Ulcers Paternal Grandmother    • Colon cancer Neg Hx       Social History     Socioeconomic History   • Marital status:    Tobacco Use   • Smoking status: Never Smoker   • Smokeless tobacco: Never Used   Substance and Sexual Activity   • Alcohol use: No   • Drug use: No   • Sexual activity: Defer      Allergies   Allergen Reactions   • Codeine Nausea And Vomiting      Current Outpatient Medications on File Prior to Visit   Medication Sig  "Dispense Refill   • amLODIPine (NORVASC) 10 MG tablet Take 10 mg by mouth Daily.     • Continuous Blood Gluc  (FreeStyle Alexandr 2 [place]) device 1 kit Continuous. 1 each 0   • Continuous Blood Gluc Sensor (FreeStyle Alexandr 2 Sensor) misc 1 each Every 14 (Fourteen) Days. 6 each 3   • Diclofenac Sodium (VOLTAREN) 1 % gel gel APPLY TO AFFECTED AREA FOUR TIMES DAILY     • Easy Comfort Pen Needles 31G X 8 MM misc      • furosemide (LASIX) 20 MG tablet Take 20 mg by mouth Daily.     • isosorbide mononitrate (IMDUR) 30 MG 24 hr tablet Take 30 mg by mouth Daily.     • Januvia 100 MG tablet Take 100 mg by mouth Daily.     • losartan (COZAAR) 100 MG tablet Take 100 mg by mouth Daily.     • nitroglycerin (NITROSTAT) 0.4 MG SL tablet      • ondansetron ODT (ZOFRAN-ODT) 4 MG disintegrating tablet Place 4 mg on the tongue Every 8 (Eight) Hours As Needed.     • potassium chloride ER (K-TAB) 20 MEQ tablet controlled-release ER tablet Take 20 mEq by mouth Daily.     • tamsulosin (FLOMAX) 0.4 MG capsule 24 hr capsule 1 capsule Daily.     • traMADol (ULTRAM) 50 MG tablet Take 50 mg by mouth 2 (Two) Times a Day.     • [DISCONTINUED] Tresiba FlexTouch 200 UNIT/ML solution pen-injector pen injection Inject 50 Units under the skin into the appropriate area as directed every night at bedtime.       No current facility-administered medications on file prior to visit.        Review of Systems   Constitutional: Positive for fatigue. Negative for unexpected weight gain and unexpected weight loss.   Eyes: Positive for blurred vision.   Endocrine: Positive for polydipsia, polyphagia and polyuria.   Skin: Positive for skin lesions.   Psychiatric/Behavioral: Negative for sleep disturbance.   All other systems reviewed and are negative.     BP (!) 158/103 (BP Location: Left arm, Patient Position: Sitting, Cuff Size: Adult)   Pulse 96   Temp 97.4 °F (36.3 °C) (Oral)   Ht 165.1 cm (65\")   Wt 103 kg (226 lb)   SpO2 95%   BMI 37.61 kg/m²  "     Physical Exam  Vitals reviewed.   Constitutional:       Appearance: Normal appearance.   Pulmonary:      Effort: Pulmonary effort is normal.   Skin:     General: Skin is warm.      Comments: Areas noted to the legs and back of torso of multiple sizes with excoriation noted.  All are circular with dryness and mild pink/redness noted.   Neurological:      Mental Status: He is alert and oriented to person, place, and time.   Psychiatric:         Mood and Affect: Mood normal.         Behavior: Behavior normal.         Thought Content: Thought content normal.         Judgment: Judgment normal.         CMP:     Lipid Panel:  No results found for: CHOL, TRIG, HDL, VLDL, LDL  HbA1c:  Lab Results   Component Value Date    HGBA1C 9.6 03/01/2022     Glucose:  Lab Results   Component Value Date    POCGLU 223 (A) 03/01/2022     Microalbumin:  No results found for: MALBCRERATIO  TSH:  No results found for: TSH    Assessment and Plan    Diagnoses and all orders for this visit:    1. Type 2 diabetes mellitus with hyperglycemia, with long-term current use of insulin (HCC) (Primary)  Assessment & Plan:  -Diabetes remains above goal with A1C >9.  -Discussed in length the dietary and exercise goals.  He is willing to speak with diabetes educator at this point.  -Discussed the importance of checking blood sugar regularly.  Check blood sugar at least 2 times daily  -Discussed the importance of medication compliance.   -Continue Januvia 100 mg QD.  -Continue Tresiba 50 units qhs.  -Discussed with patient that new medication will need to be started at this point.  Discussed GLP-1 versus SGLT2 with patient.  Patient reports that he has to take Zofran often due to GI issues.  At this point we will start him on SGLT2 and reevaluate GLP-1 at a later time.  -Start Farxiga 5 mg daily.  -Continue with yearly eye exams.  -S/S hypoglycemia reviewed and Rule of 15's advised.  -Follow-up in 3 months with A1C.    Orders:  -     POC Glycosylated  Hemoglobin (Hb A1C)  -     POC Glucose Fingerstick  -     Ambulatory Referral to Diabetic Education  -     dapagliflozin (Farxiga) 5 MG tablet tablet; Take 1 tablet by mouth Daily.  Dispense: 90 tablet; Refill: 1  -     Insulin Glargine (LANTUS SOLOSTAR) 100 UNIT/ML injection pen; Up to 60 units nightly.  Dispense: 90 mL; Refill: 2       Return in about 3 months (around 6/1/2022) for Follow-up appointment, A1C. The patient was instructed to contact the clinic with any interval questions or concerns.    Mary Moore, PRIYANKA   Endocrinology    Please note that portions of this document were completed with a voice recognition program. Efforts were made to edit the dictations, but occasionally words are mis-transcribed.

## 2022-03-02 ENCOUNTER — EDUCATION (OUTPATIENT)
Dept: MEDSURG UNIT | Facility: HOSPITAL | Age: 62
End: 2022-03-02

## 2022-03-08 ENCOUNTER — TELEPHONE (OUTPATIENT)
Dept: ENDOCRINOLOGY | Facility: CLINIC | Age: 62
End: 2022-03-08

## 2022-03-08 NOTE — TELEPHONE ENCOUNTER
At patient's lats visit we changed his long acting insulin from Tresiba to Lantus due to cost.  Patient called to inform that since taking Lantus he has increased GI issues and stopped taking it and they resolved.  D/C Lantus and start back on same dose of Tresiba.

## 2022-04-04 ENCOUNTER — TELEPHONE (OUTPATIENT)
Dept: DIABETES SERVICES | Facility: HOSPITAL | Age: 62
End: 2022-04-04

## 2022-04-04 NOTE — TELEPHONE ENCOUNTER
Patient was called and received education on diet, activity, checking blood glucose and s/s of hypo/hyperglycemia and how to count carb's, package was sent out on 32/22. Thank you for the consult

## 2022-04-24 DIAGNOSIS — Z79.4 TYPE 2 DIABETES MELLITUS WITH HYPERGLYCEMIA, WITH LONG-TERM CURRENT USE OF INSULIN: ICD-10-CM

## 2022-04-24 DIAGNOSIS — E11.65 TYPE 2 DIABETES MELLITUS WITH HYPERGLYCEMIA, WITH LONG-TERM CURRENT USE OF INSULIN: ICD-10-CM

## 2022-04-25 RX ORDER — DAPAGLIFLOZIN 5 MG/1
TABLET, FILM COATED ORAL
Qty: 30 TABLET | Refills: 1 | Status: SHIPPED | OUTPATIENT
Start: 2022-04-25 | End: 2022-07-12 | Stop reason: DRUGHIGH

## 2022-05-10 ENCOUNTER — TELEPHONE (OUTPATIENT)
Dept: PHARMACY | Facility: HOSPITAL | Age: 62
End: 2022-05-10

## 2022-05-10 NOTE — TELEPHONE ENCOUNTER
Spoke with patient.  His medication cost are an issue right now.  He was able to get his medications at a cheaper cost through the pharmacy with assistance prior.  He is going to check with them and if they are not able to get him some help with that, will try to get him either medication assistance for cost or change medication to something more feasible.

## 2022-05-12 RX ORDER — SITAGLIPTIN 100 MG/1
100 TABLET, FILM COATED ORAL DAILY
Qty: 30 TABLET | Refills: 2 | Status: SHIPPED | OUTPATIENT
Start: 2022-05-12 | End: 2022-07-12 | Stop reason: SDUPTHER

## 2022-07-12 ENCOUNTER — OFFICE VISIT (OUTPATIENT)
Dept: ENDOCRINOLOGY | Facility: CLINIC | Age: 62
End: 2022-07-12

## 2022-07-12 VITALS
SYSTOLIC BLOOD PRESSURE: 130 MMHG | WEIGHT: 214 LBS | OXYGEN SATURATION: 97 % | HEIGHT: 66 IN | BODY MASS INDEX: 34.39 KG/M2 | HEART RATE: 97 BPM | DIASTOLIC BLOOD PRESSURE: 90 MMHG

## 2022-07-12 DIAGNOSIS — E11.65 TYPE 2 DIABETES MELLITUS WITH HYPERGLYCEMIA, WITH LONG-TERM CURRENT USE OF INSULIN: Primary | ICD-10-CM

## 2022-07-12 DIAGNOSIS — Z79.4 TYPE 2 DIABETES MELLITUS WITH HYPERGLYCEMIA, WITH LONG-TERM CURRENT USE OF INSULIN: Primary | ICD-10-CM

## 2022-07-12 LAB
EXPIRATION DATE: ABNORMAL
GLUCOSE BLDC GLUCOMTR-MCNC: 143 MG/DL (ref 70–130)
HBA1C MFR BLD: 7.8 %
Lab: ABNORMAL

## 2022-07-12 PROCEDURE — 3051F HG A1C>EQUAL 7.0%<8.0%: CPT | Performed by: NURSE PRACTITIONER

## 2022-07-12 PROCEDURE — 99214 OFFICE O/P EST MOD 30 MIN: CPT | Performed by: NURSE PRACTITIONER

## 2022-07-12 PROCEDURE — 82962 GLUCOSE BLOOD TEST: CPT | Performed by: NURSE PRACTITIONER

## 2022-07-12 PROCEDURE — 83036 HEMOGLOBIN GLYCOSYLATED A1C: CPT | Performed by: NURSE PRACTITIONER

## 2022-07-12 RX ORDER — DAPAGLIFLOZIN 10 MG/1
10 TABLET, FILM COATED ORAL DAILY
Qty: 90 TABLET | Refills: 1 | Status: SHIPPED | OUTPATIENT
Start: 2022-07-12

## 2022-07-12 RX ORDER — SITAGLIPTIN 100 MG/1
100 TABLET, FILM COATED ORAL DAILY
Qty: 90 TABLET | Refills: 2 | Status: SHIPPED | OUTPATIENT
Start: 2022-07-12 | End: 2022-10-04

## 2022-07-12 NOTE — PROGRESS NOTES
No chief complaint on file.     Alex Aguilera is a 62 y.o. male had no chief complaint listed for this encounter.    T2DM.    Diabetes was diagnosed about 5 years.  Complications include none.  Last ophtho exam was utd. [facility] [place].  Current medications for diabetes include Januvia 100 mg QD, Farxiga 5 mg.  He is asking about starting on Mounjaro. He saw commercials on this and is interested in starting.  Previous meds: Metformin-made him sick  He checks his blood sugar when he feels like it is high/low.   Hypos: rarely    A1C: 11/2/2021: 9.6    The following portions of the patient's history were reviewed and updated as appropriate: allergies, current medications, past family history, past medical history, past social history, past surgical history and problem list.    Diet: whatever he can find to eat, has recently stopped drinking sweet tea and has been trying to make better food choices.-He has not made any improvements to his diet.    Past Medical History:   Diagnosis Date   • Anxiety    • Arthritis    • Back pain    • Depression    • Diabetes (HCC)    • GERD (gastroesophageal reflux disease)    • HTN (hypertension)    • Sleep apnea      Past Surgical History:   Procedure Laterality Date   • BACK SURGERY     • CHOLECYSTECTOMY  2014   • COLONOSCOPY  2012   • SINUS SURGERY     • TOTAL HIP ARTHROPLASTY      RIGHT      Family History   Problem Relation Age of Onset   • Breast cancer Mother    • Hypertension Father    • Ulcers Paternal Grandmother    • Colon cancer Neg Hx       Social History     Socioeconomic History   • Marital status:    Tobacco Use   • Smoking status: Never Smoker   • Smokeless tobacco: Never Used   Substance and Sexual Activity   • Alcohol use: No   • Drug use: No   • Sexual activity: Defer      Allergies   Allergen Reactions   • Codeine Nausea And Vomiting      Current Outpatient Medications on File Prior to Visit   Medication Sig Dispense Refill   • amLODIPine (NORVASC) 10  "MG tablet Take 10 mg by mouth Daily.     • Continuous Blood Gluc  (FreeStyle Alexandr 2 [place]) device 1 kit Continuous. 1 each 0   • Continuous Blood Gluc Sensor (FreeStyle Alexandr 2 Sensor) misc 1 each Every 14 (Fourteen) Days. 6 each 3   • Diclofenac Sodium (VOLTAREN) 1 % gel gel APPLY TO AFFECTED AREA FOUR TIMES DAILY     • Easy Comfort Pen Needles 31G X 8 MM misc      • furosemide (LASIX) 20 MG tablet Take 20 mg by mouth Daily.     • insulin degludec (TRESIBA FLEXTOUCH) 100 UNIT/ML solution pen-injector injection Inject 50 units nightly. 90 mL 2   • isosorbide mononitrate (IMDUR) 30 MG 24 hr tablet Take 60 mg by mouth Daily.     • losartan (COZAAR) 100 MG tablet Take 100 mg by mouth Daily.     • nitroglycerin (NITROSTAT) 0.4 MG SL tablet      • ondansetron ODT (ZOFRAN-ODT) 4 MG disintegrating tablet Place 4 mg on the tongue Every 8 (Eight) Hours As Needed.     • potassium chloride ER (K-TAB) 20 MEQ tablet controlled-release ER tablet Take 20 mEq by mouth Daily.     • tamsulosin (FLOMAX) 0.4 MG capsule 24 hr capsule 1 capsule Daily.     • traMADol (ULTRAM) 50 MG tablet Take 50 mg by mouth 2 (Two) Times a Day.     • [DISCONTINUED] Farxiga 5 MG tablet tablet TAKE 1 TABLET EVERY DAY 30 tablet 1   • [DISCONTINUED] Januvia 100 MG tablet Take 1 tablet by mouth Daily. 30 tablet 2     No current facility-administered medications on file prior to visit.        Review of Systems   Constitutional: Positive for fatigue. Negative for unexpected weight gain and unexpected weight loss.   Eyes: Positive for blurred vision.   Endocrine: Positive for polydipsia, polyphagia and polyuria.   Skin: Positive for skin lesions.   Psychiatric/Behavioral: Negative for sleep disturbance.   All other systems reviewed and are negative.     /90 (BP Location: Right leg, Patient Position: Sitting, Cuff Size: Adult)   Pulse 97   Ht 167.6 cm (66\")   Wt 97.1 kg (214 lb)   SpO2 97%   BMI 34.54 kg/m²      Physical Exam  Vitals " reviewed.   Constitutional:       Appearance: Normal appearance.   Cardiovascular:      Rate and Rhythm: Normal rate.   Pulmonary:      Effort: Pulmonary effort is normal.   Skin:     General: Skin is warm.   Neurological:      Mental Status: He is alert and oriented to person, place, and time.   Psychiatric:         Mood and Affect: Mood normal.         Behavior: Behavior normal.         Thought Content: Thought content normal.         Judgment: Judgment normal.         CMP:     Lipid Panel:  No results found for: CHOL, TRIG, HDL, VLDL, LDL  HbA1c:  Lab Results   Component Value Date    HGBA1C 7.8 07/12/2022    HGBA1C 9.6 03/01/2022     Glucose:  Lab Results   Component Value Date    POCGLU 143 (A) 07/12/2022     Microalbumin:  No results found for: MALBCRERATIO  TSH:  No results found for: TSH    Assessment and Plan    Diagnoses and all orders for this visit:    1. Type 2 diabetes mellitus with hyperglycemia, with long-term current use of insulin (HCC) (Primary)  Assessment & Plan:  -Diabetes is improving goal with A1C down from 9.6 to 7.8.  -Discussed in length the dietary and exercise goals.    -Discussed the importance of checking blood sugar regularly.    -Discussed the importance of medication compliance.   -Continue Januvia 100 mg QD.  -Discussed with patient that the new medication that he is asking about is not currently being covered from his insurance.  He will continue with current medication at this time and consider starting this therapy, if needed, when coverage is available.  -Increase Farxiga 10 mg QD.  -Continue with yearly eye exams.  -S/S hypoglycemia reviewed and Rule of 15's advised.  -Follow-up in 3 months with A1C.    Orders:  -     POC Glucose Fingerstick  -     POC Glycosylated Hemoglobin (Hb A1C)    Other orders  -     dapagliflozin Propanediol (Farxiga) 10 MG tablet; Take 10 mg by mouth Daily.  Dispense: 90 tablet; Refill: 1  -     Januvia 100 MG tablet; Take 1 tablet by mouth Daily.   Dispense: 90 tablet; Refill: 2       Return in about 3 months (around 10/12/2022) for Follow-up appointment, A1C. The patient was instructed to contact the clinic with any interval questions or concerns.    This document has been electronically signed by PRIYANKA Aggarwal  July 12, 2022 16:19 EDT  Endocrinology

## 2022-10-04 RX ORDER — SITAGLIPTIN 100 MG/1
100 TABLET, FILM COATED ORAL DAILY
Qty: 90 TABLET | Refills: 2 | Status: SHIPPED | OUTPATIENT
Start: 2022-10-04

## 2024-02-29 ENCOUNTER — OFFICE VISIT (OUTPATIENT)
Dept: NEUROSURGERY | Facility: CLINIC | Age: 64
End: 2024-02-29
Payer: COMMERCIAL

## 2024-02-29 VITALS — BODY MASS INDEX: 33.11 KG/M2 | HEIGHT: 66 IN | WEIGHT: 206 LBS | TEMPERATURE: 97.5 F

## 2024-02-29 DIAGNOSIS — M79.605 LEFT LEG PAIN: ICD-10-CM

## 2024-02-29 DIAGNOSIS — M47.816 SPONDYLOSIS OF LUMBAR SPINE: Primary | ICD-10-CM

## 2024-02-29 RX ORDER — DICYCLOMINE HCL 20 MG
TABLET ORAL
COMMUNITY
Start: 2023-11-28

## 2024-02-29 RX ORDER — CLONIDINE HYDROCHLORIDE 0.1 MG/1
TABLET ORAL
COMMUNITY
Start: 2023-11-28

## 2024-02-29 RX ORDER — FLUTICASONE PROPIONATE 50 MCG
SPRAY, SUSPENSION (ML) NASAL
COMMUNITY
Start: 2024-01-22

## 2024-02-29 RX ORDER — ASPIRIN 81 MG/1
81 TABLET, CHEWABLE ORAL DAILY
COMMUNITY
Start: 2023-05-31 | End: 2024-05-30

## 2024-02-29 RX ORDER — VALSARTAN 320 MG/1
320 TABLET ORAL DAILY
COMMUNITY

## 2024-02-29 RX ORDER — SIMVASTATIN 10 MG
10 TABLET ORAL
COMMUNITY
Start: 2023-08-30 | End: 2024-08-29

## 2024-02-29 RX ORDER — OMEPRAZOLE 40 MG/1
CAPSULE, DELAYED RELEASE ORAL
COMMUNITY
Start: 2024-02-15

## 2024-02-29 RX ORDER — OMEGA-3S/DHA/EPA/FISH OIL/D3 300MG-1000
400 CAPSULE ORAL DAILY
COMMUNITY

## 2024-02-29 RX ORDER — SPIRONOLACTONE 25 MG/1
25 TABLET ORAL DAILY
COMMUNITY

## 2024-02-29 RX ORDER — IRBESARTAN 300 MG/1
300 TABLET ORAL DAILY
COMMUNITY
Start: 2024-02-15

## 2024-02-29 RX ORDER — POTASSIUM CHLORIDE 20 MEQ/1
TABLET, EXTENDED RELEASE ORAL
COMMUNITY
Start: 2024-01-02

## 2024-02-29 RX ORDER — CARVEDILOL 25 MG/1
TABLET ORAL
COMMUNITY

## 2024-02-29 RX ORDER — HYDRALAZINE HYDROCHLORIDE 25 MG/1
TABLET, FILM COATED ORAL
COMMUNITY
Start: 2024-01-22
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
## 2024-04-01 ENCOUNTER — TELEPHONE (OUTPATIENT)
Dept: SURGERY | Facility: CLINIC | Age: 64
End: 2024-04-01
Payer: MEDICARE

## 2024-04-01 NOTE — TELEPHONE ENCOUNTER
Hub staff attempted to follow warm transfer process and was unsuccessful     Caller: Alex Aguilera    Relationship to patient: Self    Best call back number: [phone]    Patient is needing: RETURNING A CALL FOR SCHEDULING

## 2024-04-08 NOTE — TELEPHONE ENCOUNTER
PRESCREENING FOR OPEN ACCESS SCHEDULING    Alex Aguilera, 1960  [id number]    04/08/24    If, the patient answers yes to any of the following questions the provider will be informed prior to scheduling open access for approval and documented in the chart.    [x]  Yes  [] No    1. Have you ever had a colonoscopy in the past?      When:  15 yrs ago      Where:       Polyps or other:     []  Yes  [x] No    2. Family history of colon cancer?      Relation:       Age of onset:       Do you currently have any of the following?    []  Yes  [x] No  Rectal bleeding, if so, how long?     []  Yes  [x] No  Abdominal pain, if so, how long?    [x]  Yes  [] No  Constipation, if so, how long? Only every now and then    []  Yes  [x] No  Diarrhea, if so, how long?    []  Yes  [x] No  Weight loss, is so, how much?    [] Yes  [x] No  Small caliber stool, if so, how long?      Have you ever had any of the following conditions?    [x] Yes  [] No  Heart attack? 3-4 months ago     When?       Last cardiac workup?     Blood thinners?    [] Yes  [] No   Lung problems, asthma or COPD?  [] Yes  [] No  Oxygen required?       [] Yes  [] No  Stroke?     [] Yes  [] No  Have you ever had a reaction to anesthesia?

## 2024-04-08 NOTE — TELEPHONE ENCOUNTER
Pt stated that he had a heart attack 3-4 months ago and was treated at [facility]. Pt is also requesting to have an EGD done as well. Pt is aware of his apt date and time with Dr. Villeda.

## 2024-04-12 ENCOUNTER — TELEPHONE (OUTPATIENT)
Dept: SURGERY | Facility: CLINIC | Age: 64
End: 2024-04-12
Payer: MEDICARE

## 2024-05-06 ENCOUNTER — OFFICE VISIT (OUTPATIENT)
Dept: SURGERY | Facility: CLINIC | Age: 64
End: 2024-05-06
Payer: MEDICARE

## 2024-05-06 VITALS
BODY MASS INDEX: 31.95 KG/M2 | HEART RATE: 69 BPM | WEIGHT: 198.8 LBS | SYSTOLIC BLOOD PRESSURE: 143 MMHG | RESPIRATION RATE: 10 BRPM | DIASTOLIC BLOOD PRESSURE: 86 MMHG | OXYGEN SATURATION: 100 % | HEIGHT: 66 IN | TEMPERATURE: 97.7 F

## 2024-05-06 DIAGNOSIS — R10.13 EPIGASTRIC PAIN: Primary | ICD-10-CM

## 2024-05-06 DIAGNOSIS — K59.09 CHRONIC CONSTIPATION: ICD-10-CM

## 2024-05-06 DIAGNOSIS — Z12.11 SCREENING FOR COLON CANCER: ICD-10-CM

## 2024-05-06 PROCEDURE — 1160F RVW MEDS BY RX/DR IN RCRD: CPT | Performed by: SURGERY

## 2024-05-06 PROCEDURE — 99204 OFFICE O/P NEW MOD 45 MIN: CPT | Performed by: SURGERY

## 2024-05-06 PROCEDURE — 1159F MED LIST DOCD IN RCRD: CPT | Performed by: SURGERY

## 2024-05-06 RX ORDER — ERGOCALCIFEROL 1.25 MG/1
CAPSULE ORAL
COMMUNITY
Start: 2024-04-02

## 2024-05-06 RX ORDER — POLYETHYLENE GLYCOL 3350 17 G/17G
POWDER, FOR SOLUTION ORAL
Qty: 238 G | Refills: 0 | Status: SHIPPED | OUTPATIENT
Start: 2024-05-06

## 2024-05-06 RX ORDER — BISACODYL 5 MG/1
TABLET, DELAYED RELEASE ORAL
Qty: 4 TABLET | Refills: 0 | Status: SHIPPED | OUTPATIENT
Start: 2024-05-06

## 2024-05-06 RX ORDER — CHLORAL HYDRATE 500 MG
CAPSULE ORAL
COMMUNITY
Start: 2024-04-16

## 2024-05-06 RX ORDER — DICLOFENAC SODIUM 75 MG/1
TABLET, DELAYED RELEASE ORAL
COMMUNITY
Start: 2024-03-26

## 2024-05-06 RX ORDER — SUCRALFATE 1 G/1
TABLET ORAL
COMMUNITY
Start: 2024-04-08

## 2024-05-06 RX ORDER — ISOSORBIDE MONONITRATE 60 MG/1
TABLET, EXTENDED RELEASE ORAL
COMMUNITY
Start: 2024-03-05

## 2024-05-07 ENCOUNTER — HOSPITAL ENCOUNTER (OUTPATIENT)
Dept: NEUROLOGY | Facility: HOSPITAL | Age: 64
Discharge: HOME | End: 2024-05-07
Admitting: STUDENT IN AN ORGANIZED HEALTH CARE EDUCATION/TRAINING PROGRAM
Payer: MEDICARE

## 2024-05-07 PROCEDURE — 95886 MUSC TEST DONE W/N TEST COMP: CPT | Performed by: PSYCHIATRY & NEUROLOGY

## 2024-05-07 PROCEDURE — 95909 NRV CNDJ TST 5-6 STUDIES: CPT | Performed by: PSYCHIATRY & NEUROLOGY

## 2024-05-07 PROCEDURE — 95909 NRV CNDJ TST 5-6 STUDIES: CPT

## 2024-05-07 PROCEDURE — 95886 MUSC TEST DONE W/N TEST COMP: CPT

## 2024-05-10 PROBLEM — R10.13 EPIGASTRIC PAIN: Status: ACTIVE | Noted: 2024-05-06

## 2024-05-10 PROBLEM — Z12.11 SCREENING FOR COLON CANCER: Status: ACTIVE | Noted: 2024-05-06

## 2024-05-14 ENCOUNTER — HOSPITAL ENCOUNTER (OUTPATIENT)
Facility: HOSPITAL | Age: 64
Setting detail: HOSPITAL OUTPATIENT SURGERY
Discharge: HOME | End: 2024-05-14
Attending: SURGERY | Admitting: SURGERY
Payer: MEDICARE

## 2024-05-14 ENCOUNTER — ANESTHESIA EVENT (OUTPATIENT)
Dept: GASTROENTEROLOGY | Facility: HOSPITAL | Age: 64
End: 2024-05-14
Payer: MEDICARE

## 2024-05-14 ENCOUNTER — ANESTHESIA (OUTPATIENT)
Dept: GASTROENTEROLOGY | Facility: HOSPITAL | Age: 64
End: 2024-05-14
Payer: MEDICARE

## 2024-05-14 VITALS
DIASTOLIC BLOOD PRESSURE: 85 MMHG | SYSTOLIC BLOOD PRESSURE: 145 MMHG | OXYGEN SATURATION: 94 % | TEMPERATURE: 97.5 F | HEART RATE: 63 BPM | RESPIRATION RATE: 16 BRPM

## 2024-05-14 DIAGNOSIS — R10.13 EPIGASTRIC PAIN: ICD-10-CM

## 2024-05-14 DIAGNOSIS — Z12.11 SCREENING FOR COLON CANCER: ICD-10-CM

## 2024-05-14 LAB — GLUCOSE BLDC GLUCOMTR-MCNC: 136 MG/DL (ref 70–130)

## 2024-05-14 PROCEDURE — 25010000002 FENTANYL CITRATE (PF) 50 MCG/ML SOLUTION PREFILLED SYRINGE: Performed by: NURSE ANESTHETIST, CERTIFIED REGISTERED

## 2024-05-14 PROCEDURE — 45385 COLONOSCOPY W/LESION REMOVAL: CPT | Performed by: SURGERY

## 2024-05-14 PROCEDURE — 43239 EGD BIOPSY SINGLE/MULTIPLE: CPT | Performed by: SURGERY

## 2024-05-14 PROCEDURE — 25010000002 PROPOFOL 10 MG/ML EMULSION: Performed by: NURSE ANESTHETIST, CERTIFIED REGISTERED

## 2024-05-14 PROCEDURE — 25810000003 LACTATED RINGERS PER 1000 ML: Performed by: SURGERY

## 2024-05-14 PROCEDURE — 82948 REAGENT STRIP/BLOOD GLUCOSE: CPT | Performed by: SURGERY

## 2024-05-14 RX ORDER — FENTANYL CITRATE 50 UG/ML
INJECTION, SOLUTION INTRAMUSCULAR; INTRAVENOUS AS NEEDED
Status: DISCONTINUED | OUTPATIENT
Start: 2024-05-14 | End: 2024-05-14 | Stop reason: SURG

## 2024-05-14 RX ORDER — PROPOFOL 10 MG/ML
VIAL (ML) INTRAVENOUS CONTINUOUS PRN
Status: DISCONTINUED | OUTPATIENT
Start: 2024-05-14 | End: 2024-05-14 | Stop reason: SURG

## 2024-05-14 RX ORDER — SIMETHICONE 40MG/0.6ML
SUSPENSION, DROPS(FINAL DOSAGE FORM)(ML) ORAL AS NEEDED
Status: DISCONTINUED | OUTPATIENT
Start: 2024-05-14 | End: 2024-05-14 | Stop reason: HOSPADM

## 2024-05-14 RX ORDER — SODIUM CHLORIDE, SODIUM LACTATE, POTASSIUM CHLORIDE, CALCIUM CHLORIDE 600; 310; 30; 20 MG/100ML; MG/100ML; MG/100ML; MG/100ML
1000 INJECTION, SOLUTION INTRAVENOUS CONTINUOUS
Status: DISCONTINUED | OUTPATIENT
Start: 2024-05-14 | End: 2024-05-14 | Stop reason: HOSPADM

## 2024-05-14 RX ORDER — ONDANSETRON 2 MG/ML
4 INJECTION INTRAMUSCULAR; INTRAVENOUS ONCE AS NEEDED
Status: DISCONTINUED | OUTPATIENT
Start: 2024-05-14 | End: 2024-05-14 | Stop reason: HOSPADM

## 2024-05-14 RX ADMIN — PROPOFOL 200 MCG/KG/MIN: 10 INJECTION, EMULSION INTRAVENOUS at 13:22

## 2024-05-14 RX ADMIN — SODIUM CHLORIDE, POTASSIUM CHLORIDE, SODIUM LACTATE AND CALCIUM CHLORIDE 1000 ML: 600; 310; 30; 20 INJECTION, SOLUTION INTRAVENOUS at 12:45

## 2024-05-14 RX ADMIN — LIDOCAINE HYDROCHLORIDE 60 MG: 20 INJECTION, SOLUTION INTRAVENOUS at 13:22

## 2024-05-14 RX ADMIN — SODIUM CHLORIDE, POTASSIUM CHLORIDE, SODIUM LACTATE AND CALCIUM CHLORIDE: 600; 310; 30; 20 INJECTION, SOLUTION INTRAVENOUS at 13:49

## 2024-05-14 RX ADMIN — FENTANYL CITRATE 50 MCG: 50 INJECTION, SOLUTION INTRAMUSCULAR; INTRAVENOUS at 13:22

## 2024-05-14 NOTE — ANESTHESIA POSTPROCEDURE EVALUATION
Patient: Alex Aguilera Jr.    Procedure Summary       Date: 05/14/24 Room / Location: [facility] ENDOSCOPY 3 / [facility] ENDOSCOPY    Anesthesia Start: 1312 Anesthesia Stop: 1356    Procedures:       COLONOSCOPY WITH POLYPECTOMY (Anus)      ESOPHAGOGASTRODUODENOSCOPY WITH BIOPSY (Esophagus) Diagnosis:       Epigastric pain      Screening for colon cancer      (Epigastric pain [R10.13])      (Screening for colon cancer [Z12.11])    Surgeons: Dwayne Villeda MD Provider: Brenden Childress CRNA    Anesthesia Type: MAC ASA Status: 3            Anesthesia Type: MAC    Vitals  No vitals data found for the desired time range.          Post Anesthesia Care and Evaluation    Patient location during evaluation: PHASE II  Patient participation: complete - patient participated  Level of consciousness: awake and alert  Pain score: 0  Pain management: satisfactory to patient    Airway patency: patent  Anesthetic complications: No anesthetic complications  PONV Status: none  Cardiovascular status: acceptable and stable  Respiratory status: acceptable and spontaneous ventilation  Hydration status: acceptable    Comments: Vitals signs as noted in nursing documentation as per protocol.

## 2024-05-14 NOTE — ANESTHESIA PREPROCEDURE EVALUATION
Anesthesia Evaluation     Patient summary reviewed and Nursing notes reviewed   NPO Solid Status: > 8 hours  NPO Liquid Status: > 2 hours           Airway   Mallampati: II  TM distance: >3 FB  Neck ROM: full  Possible difficult intubation  Dental - normal exam     Pulmonary - normal exam   (+) a smoker,sleep apnea  Cardiovascular - normal exam    PT is on anticoagulation therapy  Patient on routine beta blocker    (+) hypertension, past MI       Neuro/Psych  GI/Hepatic/Renal/Endo    (+) GERD, diabetes mellitus    Musculoskeletal     (+) back pain  Abdominal    Substance History - negative use     OB/GYN          Other   arthritis,                   Anesthesia Plan    ASA 3     MAC     (Risks and benefits discussed including risk of aspiration, recall and dental damage. All patient questions answered.    Will continue with plan of care.)  intravenous induction     Anesthetic plan, risks, benefits, and alternatives have been provided, discussed and informed consent has been obtained with: patient.  Pre-procedure education provided  Plan discussed with CRNA.    CODE STATUS:

## 2024-05-16 LAB — REF LAB TEST METHOD: NORMAL

## 2024-05-16 NOTE — PROGRESS NOTES
Patient: Alex Aguilera Jr.    YOB: 1960    Date: 05/20/2024    Primary Care Provider: Lee Arriola MD    Reason for Consultation: Follow-up EGD    Chief Complaint   Patient presents with    Follow-up     Colonoscopy EGD       History of present illness: Overall patient states that he is feeling better.  Still has some pain in the epigastrium but usually associated with having a bowel movement and then it resolves.  Discussed patient has improved as well and as a result his pain has improved.    The following portions of the patient's history were reviewed and updated as appropriate: allergies, current medications, past family history, past medical history, past social history, past surgical history and problem list.    Review of Systems   Constitutional:  Negative for chills, fever and unexpected weight change.   HENT:  Negative for trouble swallowing and voice change.    Eyes:  Negative for visual disturbance.   Respiratory:  Negative for apnea, cough, chest tightness, shortness of breath and wheezing.    Cardiovascular:  Negative for chest pain, palpitations and leg swelling.   Gastrointestinal:  Positive for abdominal distention and constipation. Negative for abdominal pain, anal bleeding, blood in stool, diarrhea, nausea, rectal pain and vomiting.   Endocrine: Negative for cold intolerance and heat intolerance.   Genitourinary:  Negative for difficulty urinating, dysuria, flank pain, scrotal swelling and testicular pain.   Musculoskeletal:  Negative for back pain, gait problem and joint swelling.   Skin:  Negative for color change, rash and wound.   Neurological:  Negative for dizziness, syncope, speech difficulty, weakness, numbness and headaches.   Hematological:  Negative for adenopathy. Does not bruise/bleed easily.   Psychiatric/Behavioral:  Negative for confusion. The patient is not nervous/anxious.        Vital Signs:   Vitals:    05/20/24 1426   BP: 131/78   Pulse: 68   Resp: 10  "  Temp: 98 °F (36.7 °C)   TempSrc: Temporal   SpO2: 95%   Weight: 91.4 kg (201 lb 6.4 oz)   Height: 167.6 cm (66\")       Allergies:  Allergies   Allergen Reactions    Codeine Nausea And Vomiting    Latex Other (See Comments)     Skin irritation       Medications:    Current Outpatient Medications:     amLODIPine (NORVASC) 10 MG tablet, Take 1 tablet by mouth Daily., Disp: , Rfl:     aspirin 81 MG chewable tablet, Chew 1 tablet Daily., Disp: , Rfl:     BD ULTRA-FINE PEN NEEDLES 29G X 12.7MM misc, , Disp: , Rfl:     bisacodyl (Dulcolax) 5 MG EC tablet, Use as directed in bowel prep instructions., Disp: 4 tablet, Rfl: 0    carvedilol (COREG) 25 MG tablet, 1 tablet with food Orally Twice a day, Disp: , Rfl:     Cholecalciferol 10 MCG (400 UNIT) tablet, Take 1 tablet by mouth Daily., Disp: , Rfl:     cloNIDine (CATAPRES) 0.1 MG tablet, 1 tablet Orally as needed every 6 hours if blood pressure is over 155/95, Disp: , Rfl:     Continuous Blood Gluc  (FreeStyle Alexandr 2 [place]) device, 1 kit Continuous., Disp: 1 each, Rfl: 0    Continuous Blood Gluc Sensor (FreeStyle Alexandr 2 Sensor) misc, 1 each Every 14 (Fourteen) Days., Disp: 6 each, Rfl: 3    dapagliflozin Propanediol (Farxiga) 10 MG tablet, Take 10 mg by mouth Daily., Disp: 90 tablet, Rfl: 1    diclofenac (VOLTAREN) 75 MG EC tablet, 1 tab Orally Twice a day, Disp: , Rfl:     Diclofenac Sodium (VOLTAREN) 1 % gel gel, APPLY TO AFFECTED AREA FOUR TIMES DAILY, Disp: , Rfl:     dicyclomine (BENTYL) 20 MG tablet, 1 tablet Orally Every 12 hr, Disp: , Rfl:     Easy Comfort Pen Needles 31G X 8 MM misc, , Disp: , Rfl:     fluticasone (FLONASE) 50 MCG/ACT nasal spray, 1 spray in each nostril Nasally Once a day 30 day(s), Disp: , Rfl:     furosemide (LASIX) 20 MG tablet, Take 1 tablet by mouth Daily., Disp: , Rfl:     hydrALAZINE (APRESOLINE) 25 MG tablet, 1 tablet with food Orally Twice a day 90 days, Disp: , Rfl:     irbesartan (AVAPRO) 300 MG tablet, Take 1 tablet by " mouth Daily., Disp: , Rfl:     isosorbide mononitrate (IMDUR) 60 MG 24 hr tablet, 1 tablet in the morning orally once a day, Disp: , Rfl:     Januvia 100 MG tablet, Take 1 tablet by mouth Daily., Disp: 90 tablet, Rfl: 2    losartan (COZAAR) 100 MG tablet, Take 1 tablet by mouth Daily., Disp: , Rfl:     nitroglycerin (NITROSTAT) 0.4 MG SL tablet, , Disp: , Rfl:     Omega-3 Fatty Acids (fish oil) 1000 MG capsule capsule, 2 capsule Orally Twice a day 30 days, Disp: , Rfl:     omeprazole (priLOSEC) 40 MG capsule, 1 capsule Orally Once a day, Disp: , Rfl:     ondansetron ODT (ZOFRAN-ODT) 4 MG disintegrating tablet, Place 1 tablet on the tongue Every 8 (Eight) Hours As Needed., Disp: , Rfl:     polyethylene glycol (MiraLax) 17 GM/SCOOP powder, Mix 238g powder with 64 oz of clear liquid. Use as directed, Disp: 238 g, Rfl: 0    potassium chloride (K-DUR,KLOR-CON) 20 MEQ CR tablet, 2 tablets with food Orally Once a day, Disp: , Rfl:     potassium chloride ER (K-TAB) 20 MEQ tablet controlled-release ER tablet, Take 1 tablet by mouth Daily., Disp: , Rfl:     simvastatin (ZOCOR) 10 MG tablet, Take 1 tablet by mouth., Disp: , Rfl:     spironolactone (ALDACTONE) 25 MG tablet, Take 1 tablet by mouth Daily., Disp: , Rfl:     sucralfate (CARAFATE) 1 g tablet, 1 tablet on an empty stomach Orally Twice a day 30 days, Disp: , Rfl:     tamsulosin (FLOMAX) 0.4 MG capsule 24 hr capsule, 1 capsule Daily., Disp: , Rfl:     Toujeo SoloStar 300 UNIT/ML solution pen-injector injection, , Disp: , Rfl:     traMADol (ULTRAM) 50 MG tablet, Take 1 tablet by mouth 2 (Two) Times a Day., Disp: , Rfl:     Trulicity 0.75 MG/0.5ML solution pen-injector, , Disp: , Rfl:     valsartan (DIOVAN) 320 MG tablet, Take 1 tablet by mouth Daily., Disp: , Rfl:     Vitamin D, Ergocalciferol, 20486 units capsule, 1 capsule Orally once a week 30 days, Disp: , Rfl:     Physical Exam:   General Appearance:    Alert, cooperative, in no acute distress   Abdomen:     soft  and nontender.   \      Results Review:   I reviewed the patient's new clinical results.  Pathology was reviewed    Review of Systems was reviewed and confirmed as accurate as documented by the MA.    Assessment / Plan:    1. Epigastric pain    2. Adenomatous polyp of colon, unspecified part of colon    3. Chronic constipation      No significant abnormality seen on endoscopy other than for an adenomatous colon polyp for which I recommend a repeat colonoscopy in 5 years.  Continue treatment for his constipation.  He has changed his diet which has helped.  Perhaps he can try half the dose of MiraLAX.  Awaiting ultrasound report.  He will follow-up with me as needed.  If he has worsening issues he should follow-up    Electronically signed by Dwayne Vilelda MD  05/20/24

## 2024-05-20 ENCOUNTER — OFFICE VISIT (OUTPATIENT)
Dept: NEUROSURGERY | Facility: CLINIC | Age: 64
End: 2024-05-20
Payer: MEDICARE

## 2024-05-20 ENCOUNTER — OFFICE VISIT (OUTPATIENT)
Dept: SURGERY | Facility: CLINIC | Age: 64
End: 2024-05-20
Payer: MEDICARE

## 2024-05-20 VITALS
BODY MASS INDEX: 32.37 KG/M2 | HEIGHT: 66 IN | HEART RATE: 68 BPM | WEIGHT: 201.4 LBS | SYSTOLIC BLOOD PRESSURE: 131 MMHG | DIASTOLIC BLOOD PRESSURE: 78 MMHG | RESPIRATION RATE: 10 BRPM | TEMPERATURE: 98 F | OXYGEN SATURATION: 95 %

## 2024-05-20 VITALS — BODY MASS INDEX: 32.47 KG/M2 | HEIGHT: 66 IN | TEMPERATURE: 97.3 F | WEIGHT: 202 LBS

## 2024-05-20 DIAGNOSIS — M54.16 LUMBAR RADICULOPATHY: Primary | ICD-10-CM

## 2024-05-20 DIAGNOSIS — D12.6 ADENOMATOUS POLYP OF COLON, UNSPECIFIED PART OF COLON: ICD-10-CM

## 2024-05-20 DIAGNOSIS — R10.13 EPIGASTRIC PAIN: Primary | ICD-10-CM

## 2024-05-20 DIAGNOSIS — K59.09 CHRONIC CONSTIPATION: ICD-10-CM

## 2024-05-20 PROCEDURE — 99213 OFFICE O/P EST LOW 20 MIN: CPT | Performed by: STUDENT IN AN ORGANIZED HEALTH CARE EDUCATION/TRAINING PROGRAM

## 2024-05-20 PROCEDURE — 99213 OFFICE O/P EST LOW 20 MIN: CPT | Performed by: SURGERY

## 2024-05-20 PROCEDURE — 1159F MED LIST DOCD IN RCRD: CPT | Performed by: SURGERY

## 2024-05-20 PROCEDURE — 1160F RVW MEDS BY RX/DR IN RCRD: CPT | Performed by: SURGERY

## 2024-05-20 RX ORDER — DIAZEPAM 5 MG/1
5 TABLET ORAL
Qty: 2 TABLET | Refills: 0 | Status: SHIPPED | OUTPATIENT
Start: 2024-05-20

## 2024-05-20 RX ORDER — INSULIN GLARGINE 300 U/ML
INJECTION, SOLUTION SUBCUTANEOUS
COMMUNITY
Start: 2024-05-16

## 2024-05-20 RX ORDER — DULAGLUTIDE 0.75 MG/.5ML
INJECTION, SOLUTION SUBCUTANEOUS
COMMUNITY
Start: 2024-05-16

## 2024-05-20 RX ORDER — PEN NEEDLE, DIABETIC 29 G X1/2"
NEEDLE, DISPOSABLE MISCELLANEOUS
COMMUNITY
Start: 2024-05-16

## 2024-05-20 NOTE — PROGRESS NOTES
"NEUROSURGERY PROGRESS NOTE    Patient: Alex Aguilera Jr.  : 1960    Primary Care Provider: Lee Arriola MD    Chief Complaint: Back and left leg pain    Subjective: This is a 64-year-old male who I previously evaluated for back and left leg pain.  Based upon the distribution of the patient's pain, I referred him for an EMG.  He comes in today for follow-up.  He still continues to have pain that starts in his back and radiates in the posterior aspect of his leg.  He says it is exacerbated by standing and walking.  He has not had physical therapy in a couple of years.  He had discussed an epidural injection with a pain management physician, but elected not to proceed due to the risks.    Objective    Vital Signs: Temperature 97.3 °F (36.3 °C), temperature source Infrared, height 167.6 cm (66\"), weight 91.6 kg (202 lb).    Physical Exam  Awake, alert and oriented x 3  Opens eyes spont  Pupils 3 mm rx bilat  Extraocular muscles intact bilaterally  Face symmetric bilaterally  Tongue midline  5/5 in the lower extremities bilaterally    Current Medications:   Current Outpatient Medications:     amLODIPine (NORVASC) 10 MG tablet, Take 1 tablet by mouth Daily., Disp: , Rfl:     aspirin 81 MG chewable tablet, Chew 1 tablet Daily., Disp: , Rfl:     BD ULTRA-FINE PEN NEEDLES 29G X 12.7MM misc, , Disp: , Rfl:     bisacodyl (Dulcolax) 5 MG EC tablet, Use as directed in bowel prep instructions., Disp: 4 tablet, Rfl: 0    carvedilol (COREG) 25 MG tablet, 1 tablet with food Orally Twice a day, Disp: , Rfl:     Cholecalciferol 10 MCG (400 UNIT) tablet, Take 1 tablet by mouth Daily., Disp: , Rfl:     cloNIDine (CATAPRES) 0.1 MG tablet, 1 tablet Orally as needed every 6 hours if blood pressure is over 155/95, Disp: , Rfl:     Continuous Blood Gluc  (FreeStyle Alexandr 2 [place]) device, 1 kit Continuous., Disp: 1 each, Rfl: 0    Continuous Blood Gluc Sensor (FreeStyle Alexandr 2 Sensor) misc, 1 each Every 14 " (Fourteen) Days., Disp: 6 each, Rfl: 3    dapagliflozin Propanediol (Farxiga) 10 MG tablet, Take 10 mg by mouth Daily., Disp: 90 tablet, Rfl: 1    diclofenac (VOLTAREN) 75 MG EC tablet, 1 tab Orally Twice a day, Disp: , Rfl:     Diclofenac Sodium (VOLTAREN) 1 % gel gel, APPLY TO AFFECTED AREA FOUR TIMES DAILY, Disp: , Rfl:     dicyclomine (BENTYL) 20 MG tablet, 1 tablet Orally Every 12 hr, Disp: , Rfl:     Easy Comfort Pen Needles 31G X 8 MM misc, , Disp: , Rfl:     fluticasone (FLONASE) 50 MCG/ACT nasal spray, 1 spray in each nostril Nasally Once a day 30 day(s), Disp: , Rfl:     furosemide (LASIX) 20 MG tablet, Take 1 tablet by mouth Daily., Disp: , Rfl:     hydrALAZINE (APRESOLINE) 25 MG tablet, 1 tablet with food Orally Twice a day 90 days, Disp: , Rfl:     irbesartan (AVAPRO) 300 MG tablet, Take 1 tablet by mouth Daily., Disp: , Rfl:     isosorbide mononitrate (IMDUR) 60 MG 24 hr tablet, 1 tablet in the morning orally once a day, Disp: , Rfl:     Januvia 100 MG tablet, Take 1 tablet by mouth Daily., Disp: 90 tablet, Rfl: 2    losartan (COZAAR) 100 MG tablet, Take 1 tablet by mouth Daily., Disp: , Rfl:     nitroglycerin (NITROSTAT) 0.4 MG SL tablet, , Disp: , Rfl:     Omega-3 Fatty Acids (fish oil) 1000 MG capsule capsule, 2 capsule Orally Twice a day 30 days, Disp: , Rfl:     omeprazole (priLOSEC) 40 MG capsule, 1 capsule Orally Once a day, Disp: , Rfl:     ondansetron ODT (ZOFRAN-ODT) 4 MG disintegrating tablet, Place 1 tablet on the tongue Every 8 (Eight) Hours As Needed., Disp: , Rfl:     polyethylene glycol (MiraLax) 17 GM/SCOOP powder, Mix 238g powder with 64 oz of clear liquid. Use as directed, Disp: 238 g, Rfl: 0    potassium chloride (K-DUR,KLOR-CON) 20 MEQ CR tablet, 2 tablets with food Orally Once a day, Disp: , Rfl:     potassium chloride ER (K-TAB) 20 MEQ tablet controlled-release ER tablet, Take 1 tablet by mouth Daily., Disp: , Rfl:     simvastatin (ZOCOR) 10 MG tablet, Take 1 tablet by mouth.,  Disp: , Rfl:     spironolactone (ALDACTONE) 25 MG tablet, Take 1 tablet by mouth Daily., Disp: , Rfl:     sucralfate (CARAFATE) 1 g tablet, 1 tablet on an empty stomach Orally Twice a day 30 days, Disp: , Rfl:     tamsulosin (FLOMAX) 0.4 MG capsule 24 hr capsule, 1 capsule Daily., Disp: , Rfl:     Toujeo SoloStar 300 UNIT/ML solution pen-injector injection, , Disp: , Rfl:     traMADol (ULTRAM) 50 MG tablet, Take 1 tablet by mouth 2 (Two) Times a Day., Disp: , Rfl:     Trulicity 0.75 MG/0.5ML solution pen-injector, , Disp: , Rfl:     valsartan (DIOVAN) 320 MG tablet, Take 1 tablet by mouth Daily., Disp: , Rfl:     Vitamin D, Ergocalciferol, 19179 units capsule, 1 capsule Orally once a week 30 days, Disp: , Rfl:      Laboratory Results:                              Brief Urine Lab Results       None          Microbiology Results (last 10 days)       ** No results found for the last 240 hours. **            Diagnostic Imaging: I reviewed and independently interpreted the new imaging.     Assessment/Plan:  This is a 64-year-old male with lumbar spondylosis who I previously evaluated for back and left leg pain.  The patient's EMG confirms an S1 radiculopathy.  Unfortunately, the patient's lumbar MRI does not show a good view of the L5-S1 level on axial cuts as they skip over the entire disc base.  To better evaluate for compressive lesion of the S1 nerve root, we are going to reorder a lumbar MRI.  I am also going to prescribe him another round of physical therapy.  We will have the patient follow-up with me in 6 to 8 weeks to see how he is doing.    Diagnoses and all orders for this visit:    1. Lumbar radiculopathy (Primary)      Alex Darien Aguilera Jr.  reports that he has never smoked. He has never been exposed to tobacco smoke. He has never used smokeless tobacco.         Leroy Carpenter MD  05/20/24  11:08 EDT

## 2024-07-25 NOTE — PROGRESS NOTES
Patient: Alex Aguilera Jr.    YOB: 1960    Date: 07/23/2024    Primary Care Provider: Lee Arriola MD    Chief Complaint   Patient presents with    Cholelithiasis       SUBJECTIVE:    History of present illness: Patient here in follow-up.  Denies upper abdominal pain.  He states that his main issue has been lower abdominal pain.  More in the midline.  He has had a history of nausea vomiting now only occasionally has nausea.  Overall his symptoms have improved.  Pain is worse when he has to have a bowel movement and improved after the bowel movement.  No bleeding.  Pain is intermittent.    The following portions of the patient's history were reviewed and updated as appropriate: allergies, current medications, past family history, past medical history, past social history, past surgical history and problem list.      Review of Systems      Allergies:  Allergies   Allergen Reactions    Codeine Nausea And Vomiting    Latex Other (See Comments)     Skin irritation       Medications:    Current Outpatient Medications:     amLODIPine (NORVASC) 10 MG tablet, Take 1 tablet by mouth Daily., Disp: , Rfl:     aspirin 81 MG EC tablet, Take 1 tablet by mouth Daily., Disp: , Rfl:     BD ULTRA-FINE PEN NEEDLES 29G X 12.7MM misc, , Disp: , Rfl:     bisacodyl (Dulcolax) 5 MG EC tablet, Use as directed in bowel prep instructions., Disp: 4 tablet, Rfl: 0    carvedilol (COREG) 25 MG tablet, 1 tablet with food Orally Twice a day, Disp: , Rfl:     cetirizine (zyrTEC) 10 MG tablet, Take 1 tablet by mouth Daily., Disp: , Rfl:     Cholecalciferol 10 MCG (400 UNIT) tablet, Take 1 tablet by mouth Daily., Disp: , Rfl:     cloNIDine (CATAPRES) 0.1 MG tablet, Take 1 tablet by mouth., Disp: , Rfl:     Continuous Blood Gluc  (FreeStyle Alexandr 2 [place]) device, 1 kit Continuous., Disp: 1 each, Rfl: 0    Continuous Blood Gluc Sensor (FreeStyle Alexandr 2 Sensor) misc, 1 each Every 14 (Fourteen) Days., Disp: 6 each, Rfl:  3    dapagliflozin Propanediol (Farxiga) 10 MG tablet, Take 10 mg by mouth Daily., Disp: 90 tablet, Rfl: 1    diazePAM (Valium) 5 MG tablet, Take 1 tablet by mouth 30 Min Pre-Op for 1 dose. Before MRI, Disp: 2 tablet, Rfl: 0    diclofenac (VOLTAREN) 75 MG EC tablet, 1 tab Orally Twice a day, Disp: , Rfl:     Diclofenac Sodium (VOLTAREN) 1 % gel gel, APPLY TO AFFECTED AREA FOUR TIMES DAILY, Disp: , Rfl:     dicyclomine (BENTYL) 20 MG tablet, 1 tablet Orally Every 12 hr, Disp: , Rfl:     Easy Comfort Pen Needles 31G X 8 MM misc, , Disp: , Rfl:     Evolocumab (Repatha SureClick) solution auto-injector SureClick injection, Inject 1 mL under the skin into the appropriate area as directed., Disp: , Rfl:     fluticasone (FLONASE) 50 MCG/ACT nasal spray, 1 spray in each nostril Nasally Once a day 30 day(s), Disp: , Rfl:     furosemide (LASIX) 20 MG tablet, Take 1 tablet by mouth Daily., Disp: , Rfl:     hydrALAZINE (APRESOLINE) 25 MG tablet, Take 1 tablet by mouth., Disp: , Rfl:     irbesartan (AVAPRO) 300 MG tablet, Take 1 tablet by mouth Daily., Disp: , Rfl:     isosorbide mononitrate (IMDUR) 60 MG 24 hr tablet, 1 tablet in the morning orally once a day, Disp: , Rfl:     Januvia 100 MG tablet, Take 1 tablet by mouth Daily., Disp: 90 tablet, Rfl: 2    losartan (COZAAR) 100 MG tablet, Take 1 tablet by mouth Daily., Disp: , Rfl:     nitroglycerin (NITROSTAT) 0.4 MG SL tablet, , Disp: , Rfl:     Omega-3 Fatty Acids (fish oil) 1000 MG capsule capsule, 2 capsule Orally Twice a day 30 days, Disp: , Rfl:     omeprazole (priLOSEC) 40 MG capsule, 1 capsule Orally Once a day, Disp: , Rfl:     ondansetron ODT (ZOFRAN-ODT) 4 MG disintegrating tablet, Place 1 tablet on the tongue Every 8 (Eight) Hours As Needed., Disp: , Rfl:     polyethylene glycol (MiraLax) 17 GM/SCOOP powder, Mix 238g powder with 64 oz of clear liquid. Use as directed, Disp: 238 g, Rfl: 0    potassium chloride (K-DUR,KLOR-CON) 20 MEQ CR tablet, 2 tablets with food  Orally Once a day, Disp: , Rfl:     potassium chloride ER (K-TAB) 20 MEQ tablet controlled-release ER tablet, Take 1 tablet by mouth Daily., Disp: , Rfl:     simvastatin (ZOCOR) 10 MG tablet, Take 1 tablet by mouth., Disp: , Rfl:     spironolactone (ALDACTONE) 25 MG tablet, Take 1 tablet by mouth Daily., Disp: , Rfl:     sucralfate (CARAFATE) 1 g tablet, Take 1 tablet by mouth., Disp: , Rfl:     tamsulosin (FLOMAX) 0.4 MG capsule 24 hr capsule, 1 capsule Daily., Disp: , Rfl:     Toujeo SoloStar 300 UNIT/ML solution pen-injector injection, , Disp: , Rfl:     traMADol (ULTRAM) 50 MG tablet, Take 1 tablet by mouth 2 (Two) Times a Day., Disp: , Rfl:     triamcinolone (KENALOG) 0.1 % cream, 1 application to affected area Externally Twice a day 10 days, Disp: , Rfl:     Trulicity 0.75 MG/0.5ML solution pen-injector, , Disp: , Rfl:     valsartan (DIOVAN) 320 MG tablet, Take 1 tablet by mouth Daily., Disp: , Rfl:     Vitamin D, Ergocalciferol, 60654 units capsule, Take 1 capsule by mouth Every 7 (Seven) Days., Disp: , Rfl:     History:  Past Medical History:   Diagnosis Date    Arthritis     Back pain     Diabetes     GERD (gastroesophageal reflux disease)     HTN (hypertension)     Myocardial infarction     Sleep apnea        Past Surgical History:   Procedure Laterality Date    COLONOSCOPY  2012    COLONOSCOPY N/A 5/14/2024    Procedure: COLONOSCOPY WITH POLYPECTOMY;  Surgeon: Dwayne Villeda MD;  Location: [facility];  Service: Gastroenterology;  Laterality: N/A;    ENDOSCOPY N/A 5/14/2024    Procedure: ESOPHAGOGASTRODUODENOSCOPY WITH BIOPSY;  Surgeon: Dwayne Villeda MD;  Location: [facility];  Service: Gastroenterology;  Laterality: N/A;    SINUS SURGERY      TOTAL HIP ARTHROPLASTY      RIGHT       Family History   Problem Relation Age of Onset    Breast cancer Mother     Hypertension Father     Ulcers Paternal Grandmother     Colon cancer Neg Hx        Social History     Tobacco Use    Smoking  "status: Never     Passive exposure: Never    Smokeless tobacco: Never   Vaping Use    Vaping status: Never Used   Substance Use Topics    Alcohol use: No    Drug use: No        OBJECTIVE:    Vital Signs:   Vitals:    07/29/24 1349   BP: 128/76   Pulse: 66   Resp: 12   Temp: 98.2 °F (36.8 °C)   TempSrc: Temporal   SpO2: 92%   Weight: 88.3 kg (194 lb 9.6 oz)   Height: 167.6 cm (66\")       Physical Exam:       Abdomen: Soft nontender nondistended    Results Review:   I reviewed the patient's new clinical results.  Gallbladder ultrasound was reviewed    Review of Systems was reviewed and confirmed as accurate as documented by the MA.    ASSESSMENT/PLAN:    1. Abnormal ultrasound of liver    2. Lower abdominal pain        Gallbladder ultrasound reveals minimal sludge in the gallbladder.  Currently he denies any upper GI significant complaints.  He certainly denies epigastric and right upper quadrant pain.  He states that his pain has been mostly lower abdominal in nature and associated with bowel movements.  He had a slight abnormality on 6 show ultrasound of the liver.  I would like to go ahead and order a CT scan of this and then also further evaluate the lower abdominal pain as well.  If he has signs or symptoms that are suggestive of gallbladder etiology HIDA scan might be warranted.          Electronically signed by Dwayne Villeda MD  07/29/24          "

## 2024-07-29 ENCOUNTER — OFFICE VISIT (OUTPATIENT)
Dept: SURGERY | Facility: CLINIC | Age: 64
End: 2024-07-29
Payer: MEDICARE

## 2024-07-29 VITALS
WEIGHT: 194.6 LBS | SYSTOLIC BLOOD PRESSURE: 128 MMHG | BODY MASS INDEX: 31.27 KG/M2 | TEMPERATURE: 98.2 F | OXYGEN SATURATION: 92 % | HEART RATE: 66 BPM | HEIGHT: 66 IN | RESPIRATION RATE: 12 BRPM | DIASTOLIC BLOOD PRESSURE: 76 MMHG

## 2024-07-29 DIAGNOSIS — R10.30 LOWER ABDOMINAL PAIN: ICD-10-CM

## 2024-07-29 DIAGNOSIS — R93.2 ABNORMAL ULTRASOUND OF LIVER: Primary | ICD-10-CM

## 2024-07-29 PROCEDURE — 1159F MED LIST DOCD IN RCRD: CPT | Performed by: SURGERY

## 2024-07-29 PROCEDURE — 1160F RVW MEDS BY RX/DR IN RCRD: CPT | Performed by: SURGERY

## 2024-07-29 PROCEDURE — 99213 OFFICE O/P EST LOW 20 MIN: CPT | Performed by: SURGERY

## 2024-07-29 RX ORDER — SUCRALFATE 1 G/1
1 TABLET ORAL
COMMUNITY

## 2024-07-29 RX ORDER — ASPIRIN 81 MG/1
81 TABLET ORAL DAILY
COMMUNITY

## 2024-07-29 RX ORDER — CETIRIZINE HYDROCHLORIDE 10 MG/1
1 TABLET ORAL DAILY
COMMUNITY
Start: 2024-07-21

## 2024-07-29 RX ORDER — CLONIDINE HYDROCHLORIDE 0.1 MG/1
0.1 TABLET ORAL
COMMUNITY
Start: 2024-07-24

## 2024-07-29 RX ORDER — TRIAMCINOLONE ACETONIDE 1 MG/G
CREAM TOPICAL
COMMUNITY
Start: 2024-07-17

## 2024-07-29 RX ORDER — ERGOCALCIFEROL 1.25 MG/1
1 CAPSULE ORAL
COMMUNITY

## 2024-07-29 RX ORDER — EVOLOCUMAB 140 MG/ML
140 INJECTION, SOLUTION SUBCUTANEOUS
COMMUNITY
Start: 2024-07-24

## 2024-07-29 RX ORDER — HYDRALAZINE HYDROCHLORIDE 25 MG/1
25 TABLET, FILM COATED ORAL
COMMUNITY
Start: 2024-07-24

## 2025-03-19 ENCOUNTER — TRANSCRIBE ORDERS (OUTPATIENT)
Dept: ADMINISTRATIVE | Facility: HOSPITAL | Age: 65
End: 2025-03-19
Payer: MEDICARE

## 2025-03-19 DIAGNOSIS — C09.9 MALIGNANT NEOPLASM OF TONSIL: Primary | ICD-10-CM

## 2025-03-28 ENCOUNTER — HOSPITAL ENCOUNTER (OUTPATIENT)
Facility: HOSPITAL | Age: 65
Discharge: HOME | End: 2025-03-28
Payer: MEDICARE

## 2025-03-28 ENCOUNTER — HOSPITAL ENCOUNTER (OUTPATIENT)
Facility: HOSPITAL | Age: 65
End: 2025-03-28
Payer: MEDICARE

## 2025-03-28 DIAGNOSIS — C09.9 MALIGNANT NEOPLASM OF TONSIL: ICD-10-CM

## 2025-03-28 LAB — GLUCOSE BLDC GLUCOMTR-MCNC: 243 MG/DL (ref 70–130)

## 2025-03-28 PROCEDURE — 82948 REAGENT STRIP/BLOOD GLUCOSE: CPT

## 2025-04-03 ENCOUNTER — HOSPITAL ENCOUNTER (OUTPATIENT)
Facility: HOSPITAL | Age: 65
Discharge: HOME | End: 2025-04-03
Payer: MEDICARE

## 2025-04-03 LAB — GLUCOSE BLDC GLUCOMTR-MCNC: 152 MG/DL (ref 70–130)

## 2025-04-03 PROCEDURE — 82948 REAGENT STRIP/BLOOD GLUCOSE: CPT

## 2025-04-03 PROCEDURE — 34310000005 FLUDEOXYGLUCOSE F18 SOLUTION: Performed by: OTOLARYNGOLOGY

## 2025-04-03 PROCEDURE — 78815 PET IMAGE W/CT SKULL-THIGH: CPT

## 2025-04-03 PROCEDURE — A9552 F18 FDG: HCPCS | Performed by: OTOLARYNGOLOGY

## 2025-04-03 RX ADMIN — FLUDEOXYGLUCOSE F18 1 DOSE: 300 INJECTION INTRAVENOUS at 14:16

## 2025-05-08 ENCOUNTER — TELEPHONE (OUTPATIENT)
Dept: SURGERY | Facility: CLINIC | Age: 65
End: 2025-05-08

## 2025-05-08 ENCOUNTER — LAB (OUTPATIENT)
Dept: LAB | Facility: HOSPITAL | Age: 65
End: 2025-05-08
Payer: MEDICARE

## 2025-05-08 ENCOUNTER — OFFICE VISIT (OUTPATIENT)
Dept: SURGERY | Facility: CLINIC | Age: 65
End: 2025-05-08
Payer: MEDICARE

## 2025-05-08 ENCOUNTER — TRANSCRIBE ORDERS (OUTPATIENT)
Dept: LAB | Facility: HOSPITAL | Age: 65
End: 2025-05-08
Payer: MEDICARE

## 2025-05-08 VITALS
TEMPERATURE: 97.5 F | SYSTOLIC BLOOD PRESSURE: 106 MMHG | OXYGEN SATURATION: 95 % | DIASTOLIC BLOOD PRESSURE: 58 MMHG | HEIGHT: 66 IN | WEIGHT: 183 LBS | BODY MASS INDEX: 29.41 KG/M2 | HEART RATE: 76 BPM

## 2025-05-08 DIAGNOSIS — R63.4 UNINTENTIONAL WEIGHT LOSS OF 10% BODY WEIGHT WITHIN 6 MONTHS: ICD-10-CM

## 2025-05-08 DIAGNOSIS — C09.9 TONSILLAR CANCER: Primary | ICD-10-CM

## 2025-05-08 DIAGNOSIS — E86.0 DEHYDRATION: ICD-10-CM

## 2025-05-08 DIAGNOSIS — C09.9 SQUAMOUS CELL CARCINOMA OF TONSIL: ICD-10-CM

## 2025-05-08 DIAGNOSIS — C09.9 SQUAMOUS CELL CARCINOMA OF TONSIL: Primary | ICD-10-CM

## 2025-05-08 LAB
ALBUMIN SERPL-MCNC: 3.8 G/DL (ref 3.5–5.2)
ALBUMIN/GLOB SERPL: 1.7 G/DL
ALP SERPL-CCNC: 66 U/L (ref 39–117)
ALT SERPL W P-5'-P-CCNC: 7 U/L (ref 1–41)
ANION GAP SERPL CALCULATED.3IONS-SCNC: 12.6 MMOL/L (ref 5–15)
AST SERPL-CCNC: 8 U/L (ref 1–40)
BILIRUB SERPL-MCNC: 0.8 MG/DL (ref 0–1.2)
BUN SERPL-MCNC: 31 MG/DL (ref 8–23)
BUN/CREAT SERPL: 43.1 (ref 7–25)
CALCIUM SPEC-SCNC: 9 MG/DL (ref 8.6–10.5)
CHLORIDE SERPL-SCNC: 104 MMOL/L (ref 98–107)
CO2 SERPL-SCNC: 21.4 MMOL/L (ref 22–29)
CREAT SERPL-MCNC: 0.72 MG/DL (ref 0.76–1.27)
EGFRCR SERPLBLD CKD-EPI 2021: 100.8 ML/MIN/1.73
GLOBULIN UR ELPH-MCNC: 2.2 GM/DL
GLUCOSE SERPL-MCNC: 195 MG/DL (ref 65–99)
POTASSIUM SERPL-SCNC: 4.1 MMOL/L (ref 3.5–5.2)
PROT SERPL-MCNC: 6 G/DL (ref 6–8.5)
SODIUM SERPL-SCNC: 138 MMOL/L (ref 136–145)

## 2025-05-08 PROCEDURE — 80053 COMPREHEN METABOLIC PANEL: CPT

## 2025-05-08 PROCEDURE — 99214 OFFICE O/P EST MOD 30 MIN: CPT | Performed by: SURGERY

## 2025-05-08 PROCEDURE — 1159F MED LIST DOCD IN RCRD: CPT | Performed by: SURGERY

## 2025-05-08 PROCEDURE — 1160F RVW MEDS BY RX/DR IN RCRD: CPT | Performed by: SURGERY

## 2025-05-08 PROCEDURE — 36415 COLL VENOUS BLD VENIPUNCTURE: CPT

## 2025-05-08 RX ORDER — LIDOCAINE HYDROCHLORIDE 20 MG/ML
SOLUTION OROPHARYNGEAL
COMMUNITY

## 2025-05-08 RX ORDER — PROMETHAZINE HYDROCHLORIDE 25 MG/1
TABLET ORAL
COMMUNITY
Start: 2025-04-02

## 2025-05-08 RX ORDER — DEXAMETHASONE 4 MG/1
4 TABLET ORAL
COMMUNITY
Start: 2025-05-01

## 2025-05-08 RX ORDER — LORAZEPAM 0.5 MG/1
0.5 TABLET ORAL NIGHTLY PRN
COMMUNITY
Start: 2025-03-17

## 2025-05-08 RX ORDER — GABAPENTIN 300 MG/1
1 CAPSULE ORAL NIGHTLY
COMMUNITY
Start: 2025-04-04

## 2025-05-08 RX ORDER — GEL DRESSING
GEL (GRAM) TOPICAL
COMMUNITY
Start: 2025-04-08

## 2025-05-08 RX ORDER — HYDROCODONE BITARTRATE AND ACETAMINOPHEN 5; 325 MG/1; MG/1
TABLET ORAL
COMMUNITY
Start: 2025-03-18

## 2025-05-08 RX ORDER — ONDANSETRON 8 MG/1
8 TABLET, FILM COATED ORAL
COMMUNITY
Start: 2025-04-17

## 2025-05-08 RX ORDER — MOMETASONE FUROATE 1 MG/G
CREAM TOPICAL DAILY
COMMUNITY
Start: 2025-04-04

## 2025-05-08 RX ORDER — SEMAGLUTIDE 0.68 MG/ML
INJECTION, SOLUTION SUBCUTANEOUS
COMMUNITY
Start: 2025-01-30

## 2025-05-08 RX ORDER — GLUCAGON INJECTION, SOLUTION 1 MG/.2ML
INJECTION, SOLUTION SUBCUTANEOUS
COMMUNITY
Start: 2024-11-25

## 2025-05-08 RX ORDER — SODIUM FLUORIDE 5 MG/G
GEL, DENTIFRICE DENTAL
COMMUNITY
Start: 2025-04-07

## 2025-05-08 RX ORDER — PROCHLORPERAZINE MALEATE 10 MG
10 TABLET ORAL EVERY 6 HOURS PRN
COMMUNITY
Start: 2025-04-17

## 2025-05-08 RX ORDER — ALIROCUMAB 75 MG/ML
INJECTION, SOLUTION SUBCUTANEOUS
COMMUNITY

## 2025-05-08 RX ORDER — OXYCODONE HYDROCHLORIDE 5 MG/1
1 TABLET ORAL EVERY 4 HOURS PRN
COMMUNITY
Start: 2025-03-27 | End: 2025-05-26

## 2025-05-08 RX ORDER — ALUMINUM HYDROXIDE, MAGNESIUM HYDROXIDE, DIMETHICONE 400; 400; 40 MG/5ML; MG/5ML; MG/5ML
SUSPENSION ORAL
COMMUNITY
Start: 2025-03-25

## 2025-05-08 NOTE — PROGRESS NOTES
Patient: Alex Aguilera Jr.    YOB: 1960    Date: 05/08/2025    Primary Care Provider: Lee Arriola MD    Chief Complaint   Patient presents with    Cancer       SUBJECTIVE:    History of present illness:  The patient has a history of squamous cell carcinoma of the left tonsil.  He has been getting treatment with chemotherapy and radiation therapy.  He states that it causes him great pain to swallow anything including liquids.  He is having some dehydration and weight loss issues.  Referred for a PEG tube placement.  The following portions of the patient's history were reviewed and updated as appropriate: allergies, current medications, past family history, past medical history, past social history, past surgical history and problem list.      Review of Systems   Constitutional:  Negative for chills, fever and unexpected weight change.   HENT:  Negative for trouble swallowing and voice change.    Eyes:  Negative for visual disturbance.   Respiratory:  Negative for apnea, cough, chest tightness, shortness of breath and wheezing.    Cardiovascular:  Negative for chest pain, palpitations and leg swelling.   Gastrointestinal:  Negative for abdominal distention, abdominal pain, anal bleeding, blood in stool, constipation, diarrhea, nausea, rectal pain and vomiting.   Endocrine: Negative for cold intolerance and heat intolerance.   Genitourinary:  Negative for difficulty urinating, dysuria, flank pain, scrotal swelling and testicular pain.   Musculoskeletal:  Negative for back pain, gait problem and joint swelling.   Skin:  Negative for color change, rash and wound.   Neurological:  Negative for dizziness, syncope, speech difficulty, weakness, numbness and headaches.   Hematological:  Negative for adenopathy. Does not bruise/bleed easily.   Psychiatric/Behavioral:  Negative for confusion. The patient is not nervous/anxious.          Allergies:  Allergies   Allergen Reactions    Codeine Nausea And  Vomiting    Latex Other (See Comments)     Skin irritation       Medications:    Current Outpatient Medications:     amLODIPine (NORVASC) 10 MG tablet, Take 1 tablet by mouth Daily., Disp: , Rfl:     aspirin 81 MG EC tablet, Take 1 tablet by mouth Daily., Disp: , Rfl:     BD ULTRA-FINE PEN NEEDLES 29G X 12.7MM misc, , Disp: , Rfl:     carvedilol (COREG) 25 MG tablet, 1 tablet with food Orally Twice a day, Disp: , Rfl:     Cholecalciferol 10 MCG (400 UNIT) tablet, Take 1 tablet by mouth Daily., Disp: , Rfl:     cloNIDine (CATAPRES) 0.1 MG tablet, Take 1 tablet by mouth., Disp: , Rfl:     Continuous Blood Gluc  (FreeStyle Alexandr 2 [place]) device, 1 kit Continuous., Disp: 1 each, Rfl: 0    Continuous Blood Gluc Sensor (FreeStyle Alexandr 2 Sensor) misc, 1 each Every 14 (Fourteen) Days., Disp: 6 each, Rfl: 3    dapagliflozin Propanediol (Farxiga) 10 MG tablet, Take 10 mg by mouth Daily., Disp: 90 tablet, Rfl: 1    Dermatological Products, Misc. (Strata xrt) gel, Apply a thin layer once or twice a day as needed, Disp: , Rfl:     dexAMETHasone (DECADRON) 4 MG tablet, Take 1 tablet by mouth., Disp: , Rfl:     diazePAM (Valium) 5 MG tablet, Take 1 tablet by mouth 30 Min Pre-Op for 1 dose. Before MRI, Disp: 2 tablet, Rfl: 0    diclofenac (VOLTAREN) 75 MG EC tablet, 1 tab Orally Twice a day, Disp: , Rfl:     Diclofenac Sodium (VOLTAREN) 1 % gel gel, APPLY TO AFFECTED AREA FOUR TIMES DAILY, Disp: , Rfl:     dicyclomine (BENTYL) 20 MG tablet, 1 tablet Orally Every 12 hr, Disp: , Rfl:     Easy Comfort Pen Needles 31G X 8 MM misc, , Disp: , Rfl:     Fluconazole (DIFLUCAN PO), , Disp: , Rfl:     gabapentin (NEURONTIN) 300 MG capsule, Take 1 capsule by mouth Every Night., Disp: , Rfl:     Glucagon (Gvoke HypoPen 2-Pack) 1 MG/0.2ML solution auto-injector, as directed Subcutaneous as needed 30 days, Disp: , Rfl:     hydrALAZINE (APRESOLINE) 25 MG tablet, Take 1 tablet by mouth., Disp: , Rfl:     HYDROcodone-acetaminophen (NORCO)  5-325 MG per tablet, Take 1 tablet every 6 hours by oral route for 3 days., Disp: , Rfl:     isosorbide mononitrate (IMDUR) 60 MG 24 hr tablet, 1 tablet in the morning orally once a day, Disp: , Rfl:     Lidocaine Viscous HCl (XYLOCAINE) 2 % solution, SWISH 5ML IN MOUTH AS NEEDED FOR THROAT PAIN, Disp: , Rfl:     LORazepam (ATIVAN) 0.5 MG tablet, Take 1 tablet by mouth At Night As Needed., Disp: , Rfl:     mometasone (ELOCON) 0.1 % cream, Apply  topically to the appropriate area as directed Daily., Disp: , Rfl:     naloxone (NARCAN) 4 MG/0.1ML nasal spray, Administer 1 spray into the nostril(s) as directed by provider., Disp: , Rfl:     nitroglycerin (NITROSTAT) 0.4 MG SL tablet, , Disp: , Rfl:     omeprazole (priLOSEC) 40 MG capsule, 1 capsule Orally Once a day, Disp: , Rfl:     ondansetron (ZOFRAN) 8 MG tablet, Take 1 tablet by mouth., Disp: , Rfl:     ondansetron ODT (ZOFRAN-ODT) 4 MG disintegrating tablet, Place 1 tablet on the tongue Every 8 (Eight) Hours As Needed., Disp: , Rfl:     oxyCODONE (ROXICODONE) 5 MG immediate release tablet, Take 1 tablet by mouth Every 4 (Four) Hours As Needed., Disp: , Rfl:     polyethylene glycol (MiraLax) 17 GM/SCOOP powder, Mix 238g powder with 64 oz of clear liquid. Use as directed, Disp: 238 g, Rfl: 0    potassium chloride (K-DUR,KLOR-CON) 20 MEQ CR tablet, 2 tablets with food Orally Once a day, Disp: , Rfl:     Praluent 75 MG/ML solution auto-injector, Inject 1 mL every 2 weeks by subcutaneous route., Disp: , Rfl:     prochlorperazine (COMPAZINE) 10 MG tablet, Take 1 tablet by mouth Every 6 (Six) Hours As Needed., Disp: , Rfl:     promethazine (PHENERGAN) 25 MG tablet, TAKE ONE-HALF TABLET THREE TIMES DAILY AS NEEDED FOR NAUSEA/VOMITNG, Disp: , Rfl:     Sodium Fluoride 1.1 % gel, USE AT night time, instead of your regular toothpast, Disp: , Rfl:     sucralfate (CARAFATE) 1 g tablet, Take 1 tablet by mouth., Disp: , Rfl:     tamsulosin (FLOMAX) 0.4 MG capsule 24 hr capsule, 1  capsule Daily., Disp: , Rfl:     traMADol (ULTRAM) 50 MG tablet, Take 1 tablet by mouth 2 (Two) Times a Day., Disp: , Rfl:     triamcinolone (KENALOG) 0.1 % cream, 1 application to affected area Externally Twice a day 10 days, Disp: , Rfl:     valsartan (DIOVAN) 320 MG tablet, Take 1 tablet by mouth Daily., Disp: , Rfl:     bisacodyl (Dulcolax) 5 MG EC tablet, Use as directed in bowel prep instructions. (Patient not taking: Reported on 5/8/2025), Disp: 4 tablet, Rfl: 0    cetirizine (zyrTEC) 10 MG tablet, Take 1 tablet by mouth Daily. (Patient not taking: Reported on 5/8/2025), Disp: , Rfl:     Evolocumab (Repatha SureClick) solution auto-injector SureClick injection, Inject 1 mL under the skin into the appropriate area as directed. (Patient not taking: Reported on 5/8/2025), Disp: , Rfl:     fluticasone (FLONASE) 50 MCG/ACT nasal spray, 1 spray in each nostril Nasally Once a day 30 day(s) (Patient not taking: Reported on 5/8/2025), Disp: , Rfl:     furosemide (LASIX) 20 MG tablet, Take 1 tablet by mouth Daily. (Patient not taking: Reported on 5/8/2025), Disp: , Rfl:     HM Antacid Anti-Gas Ex St 400-400-40 MG/5ML suspension, MIX WITH BENADRYL AND CARAFATE - SWISH AND SWALLOW ONE TEASPOONFUL EVERY SIX HOURS AS NEEDED (Patient not taking: Reported on 5/8/2025), Disp: , Rfl:     irbesartan (AVAPRO) 300 MG tablet, Take 1 tablet by mouth Daily. (Patient not taking: Reported on 5/8/2025), Disp: , Rfl:     Januvia 100 MG tablet, Take 1 tablet by mouth Daily. (Patient not taking: Reported on 5/8/2025), Disp: 90 tablet, Rfl: 2    losartan (COZAAR) 100 MG tablet, Take 1 tablet by mouth Daily. (Patient not taking: Reported on 5/8/2025), Disp: , Rfl:     Omega-3 Fatty Acids (fish oil) 1000 MG capsule capsule, 2 capsule Orally Twice a day 30 days (Patient not taking: Reported on 5/8/2025), Disp: , Rfl:     Ozempic, 0.25 or 0.5 MG/DOSE, 2 MG/3ML solution pen-injector, , Disp: , Rfl:     potassium chloride ER (K-TAB) 20 MEQ  "tablet controlled-release ER tablet, Take 1 tablet by mouth Daily. (Patient not taking: Reported on 5/8/2025), Disp: , Rfl:     simvastatin (ZOCOR) 10 MG tablet, Take 1 tablet by mouth., Disp: , Rfl:     History:  Past Medical History:   Diagnosis Date    Arthritis     Back pain     Diabetes     GERD (gastroesophageal reflux disease)     HTN (hypertension)     Myocardial infarction     Sleep apnea        Past Surgical History:   Procedure Laterality Date    COLONOSCOPY  2012    COLONOSCOPY N/A 5/14/2024    Procedure: COLONOSCOPY WITH POLYPECTOMY;  Surgeon: Dwayne Villeda MD;  Location: [facility] ENDOSCOPY;  Service: Gastroenterology;  Laterality: N/A;    ENDOSCOPY N/A 5/14/2024    Procedure: ESOPHAGOGASTRODUODENOSCOPY WITH BIOPSY;  Surgeon: Dwayne Villeda MD;  Location: [facility] ENDOSCOPY;  Service: Gastroenterology;  Laterality: N/A;    SINUS SURGERY      TOTAL HIP ARTHROPLASTY      RIGHT       Family History   Problem Relation Age of Onset    Breast cancer Mother     Arthritis Mother     Hypertension Father     Ulcers Paternal Grandmother     Diabetes Sister     Hypertension Sister     Colon cancer Neg Hx        Social History     Tobacco Use    Smoking status: Never     Passive exposure: Never    Smokeless tobacco: Never   Vaping Use    Vaping status: Never Used   Substance Use Topics    Alcohol use: No    Drug use: No        OBJECTIVE:    Vital Signs:   Vitals:    05/08/25 1433   BP: 106/58   Pulse: 76   Temp: 97.5 °F (36.4 °C)   TempSrc: Infrared   SpO2: 95%   Weight: 83 kg (183 lb)   Height: 167.6 cm (65.98\")       Physical Exam:       General Appearance:    Alert, cooperative, in no acute distress   Head:    Normocephalic, without obvious abnormality, atraumatic   Eyes:            Normal.  No scleral icterus.  PERRLA    Lungs:     Clear to auscultation,respirations regular, even and                  unlabored    Heart:    Regular rhythm and normal rate,    Abdomen:   Soft and nontender   Extremities:   " Moves all extremities well, no edema, no cyanosis, no             redness   Skin:   No bleeding, bruising or rash   Neurologic:   Normal without gross deficits.   Psychiatric: No evidence of depression or anxiety          Results Review:   I reviewed the patient's new clinical results.    Review of Systems was reviewed and confirmed as accurate as documented by the MA.    ASSESSMENT/PLAN:    1. Tonsillar cancer    2. Dehydration        Patient with tonsillar cancer undergoing treatment now having very poor oral intake due to pain.  I recommend a PEG tube placement.  He understands procedure as well as the risk of bleeding, infection, intestinal injury, leaking of tube feeds etc.  He wishes to proceed          Electronically signed by Dwayne Villeda MD  05/08/25

## 2025-05-08 NOTE — TELEPHONE ENCOUNTER
[facility] [place] [facility] CALLED AND NEED A PEG PLACEMENT. FAXED WORK WORK TO THE OFFICE FOR THEM TO SET UP EVERYTHING FOR THE PATIENT.

## 2025-05-08 NOTE — H&P (VIEW-ONLY)
Patient: Alex Aguilera Jr.    YOB: 1960    Date: 05/08/2025    Primary Care Provider: Lee Arriola MD    Chief Complaint   Patient presents with    Cancer       SUBJECTIVE:    History of present illness:  The patient has a history of squamous cell carcinoma of the left tonsil.  He has been getting treatment with chemotherapy and radiation therapy.  He states that it causes him great pain to swallow anything including liquids.  He is having some dehydration and weight loss issues.  Referred for a PEG tube placement.  The following portions of the patient's history were reviewed and updated as appropriate: allergies, current medications, past family history, past medical history, past social history, past surgical history and problem list.      Review of Systems   Constitutional:  Negative for chills, fever and unexpected weight change.   HENT:  Negative for trouble swallowing and voice change.    Eyes:  Negative for visual disturbance.   Respiratory:  Negative for apnea, cough, chest tightness, shortness of breath and wheezing.    Cardiovascular:  Negative for chest pain, palpitations and leg swelling.   Gastrointestinal:  Negative for abdominal distention, abdominal pain, anal bleeding, blood in stool, constipation, diarrhea, nausea, rectal pain and vomiting.   Endocrine: Negative for cold intolerance and heat intolerance.   Genitourinary:  Negative for difficulty urinating, dysuria, flank pain, scrotal swelling and testicular pain.   Musculoskeletal:  Negative for back pain, gait problem and joint swelling.   Skin:  Negative for color change, rash and wound.   Neurological:  Negative for dizziness, syncope, speech difficulty, weakness, numbness and headaches.   Hematological:  Negative for adenopathy. Does not bruise/bleed easily.   Psychiatric/Behavioral:  Negative for confusion. The patient is not nervous/anxious.          Allergies:  Allergies   Allergen Reactions    Codeine Nausea And  Vomiting    Latex Other (See Comments)     Skin irritation       Medications:    Current Outpatient Medications:     amLODIPine (NORVASC) 10 MG tablet, Take 1 tablet by mouth Daily., Disp: , Rfl:     aspirin 81 MG EC tablet, Take 1 tablet by mouth Daily., Disp: , Rfl:     BD ULTRA-FINE PEN NEEDLES 29G X 12.7MM misc, , Disp: , Rfl:     carvedilol (COREG) 25 MG tablet, 1 tablet with food Orally Twice a day, Disp: , Rfl:     Cholecalciferol 10 MCG (400 UNIT) tablet, Take 1 tablet by mouth Daily., Disp: , Rfl:     cloNIDine (CATAPRES) 0.1 MG tablet, Take 1 tablet by mouth., Disp: , Rfl:     Continuous Blood Gluc  (FreeStyle Alexandr 2 [place]) device, 1 kit Continuous., Disp: 1 each, Rfl: 0    Continuous Blood Gluc Sensor (FreeStyle Alexandr 2 Sensor) misc, 1 each Every 14 (Fourteen) Days., Disp: 6 each, Rfl: 3    dapagliflozin Propanediol (Farxiga) 10 MG tablet, Take 10 mg by mouth Daily., Disp: 90 tablet, Rfl: 1    Dermatological Products, Misc. (Strata xrt) gel, Apply a thin layer once or twice a day as needed, Disp: , Rfl:     dexAMETHasone (DECADRON) 4 MG tablet, Take 1 tablet by mouth., Disp: , Rfl:     diazePAM (Valium) 5 MG tablet, Take 1 tablet by mouth 30 Min Pre-Op for 1 dose. Before MRI, Disp: 2 tablet, Rfl: 0    diclofenac (VOLTAREN) 75 MG EC tablet, 1 tab Orally Twice a day, Disp: , Rfl:     Diclofenac Sodium (VOLTAREN) 1 % gel gel, APPLY TO AFFECTED AREA FOUR TIMES DAILY, Disp: , Rfl:     dicyclomine (BENTYL) 20 MG tablet, 1 tablet Orally Every 12 hr, Disp: , Rfl:     Easy Comfort Pen Needles 31G X 8 MM misc, , Disp: , Rfl:     Fluconazole (DIFLUCAN PO), , Disp: , Rfl:     gabapentin (NEURONTIN) 300 MG capsule, Take 1 capsule by mouth Every Night., Disp: , Rfl:     Glucagon (Gvoke HypoPen 2-Pack) 1 MG/0.2ML solution auto-injector, as directed Subcutaneous as needed 30 days, Disp: , Rfl:     hydrALAZINE (APRESOLINE) 25 MG tablet, Take 1 tablet by mouth., Disp: , Rfl:     HYDROcodone-acetaminophen (NORCO)  5-325 MG per tablet, Take 1 tablet every 6 hours by oral route for 3 days., Disp: , Rfl:     isosorbide mononitrate (IMDUR) 60 MG 24 hr tablet, 1 tablet in the morning orally once a day, Disp: , Rfl:     Lidocaine Viscous HCl (XYLOCAINE) 2 % solution, SWISH 5ML IN MOUTH AS NEEDED FOR THROAT PAIN, Disp: , Rfl:     LORazepam (ATIVAN) 0.5 MG tablet, Take 1 tablet by mouth At Night As Needed., Disp: , Rfl:     mometasone (ELOCON) 0.1 % cream, Apply  topically to the appropriate area as directed Daily., Disp: , Rfl:     naloxone (NARCAN) 4 MG/0.1ML nasal spray, Administer 1 spray into the nostril(s) as directed by provider., Disp: , Rfl:     nitroglycerin (NITROSTAT) 0.4 MG SL tablet, , Disp: , Rfl:     omeprazole (priLOSEC) 40 MG capsule, 1 capsule Orally Once a day, Disp: , Rfl:     ondansetron (ZOFRAN) 8 MG tablet, Take 1 tablet by mouth., Disp: , Rfl:     ondansetron ODT (ZOFRAN-ODT) 4 MG disintegrating tablet, Place 1 tablet on the tongue Every 8 (Eight) Hours As Needed., Disp: , Rfl:     oxyCODONE (ROXICODONE) 5 MG immediate release tablet, Take 1 tablet by mouth Every 4 (Four) Hours As Needed., Disp: , Rfl:     polyethylene glycol (MiraLax) 17 GM/SCOOP powder, Mix 238g powder with 64 oz of clear liquid. Use as directed, Disp: 238 g, Rfl: 0    potassium chloride (K-DUR,KLOR-CON) 20 MEQ CR tablet, 2 tablets with food Orally Once a day, Disp: , Rfl:     Praluent 75 MG/ML solution auto-injector, Inject 1 mL every 2 weeks by subcutaneous route., Disp: , Rfl:     prochlorperazine (COMPAZINE) 10 MG tablet, Take 1 tablet by mouth Every 6 (Six) Hours As Needed., Disp: , Rfl:     promethazine (PHENERGAN) 25 MG tablet, TAKE ONE-HALF TABLET THREE TIMES DAILY AS NEEDED FOR NAUSEA/VOMITNG, Disp: , Rfl:     Sodium Fluoride 1.1 % gel, USE AT night time, instead of your regular toothpast, Disp: , Rfl:     sucralfate (CARAFATE) 1 g tablet, Take 1 tablet by mouth., Disp: , Rfl:     tamsulosin (FLOMAX) 0.4 MG capsule 24 hr capsule, 1  capsule Daily., Disp: , Rfl:     traMADol (ULTRAM) 50 MG tablet, Take 1 tablet by mouth 2 (Two) Times a Day., Disp: , Rfl:     triamcinolone (KENALOG) 0.1 % cream, 1 application to affected area Externally Twice a day 10 days, Disp: , Rfl:     valsartan (DIOVAN) 320 MG tablet, Take 1 tablet by mouth Daily., Disp: , Rfl:     bisacodyl (Dulcolax) 5 MG EC tablet, Use as directed in bowel prep instructions. (Patient not taking: Reported on 5/8/2025), Disp: 4 tablet, Rfl: 0    cetirizine (zyrTEC) 10 MG tablet, Take 1 tablet by mouth Daily. (Patient not taking: Reported on 5/8/2025), Disp: , Rfl:     Evolocumab (Repatha SureClick) solution auto-injector SureClick injection, Inject 1 mL under the skin into the appropriate area as directed. (Patient not taking: Reported on 5/8/2025), Disp: , Rfl:     fluticasone (FLONASE) 50 MCG/ACT nasal spray, 1 spray in each nostril Nasally Once a day 30 day(s) (Patient not taking: Reported on 5/8/2025), Disp: , Rfl:     furosemide (LASIX) 20 MG tablet, Take 1 tablet by mouth Daily. (Patient not taking: Reported on 5/8/2025), Disp: , Rfl:     HM Antacid Anti-Gas Ex St 400-400-40 MG/5ML suspension, MIX WITH BENADRYL AND CARAFATE - SWISH AND SWALLOW ONE TEASPOONFUL EVERY SIX HOURS AS NEEDED (Patient not taking: Reported on 5/8/2025), Disp: , Rfl:     irbesartan (AVAPRO) 300 MG tablet, Take 1 tablet by mouth Daily. (Patient not taking: Reported on 5/8/2025), Disp: , Rfl:     Januvia 100 MG tablet, Take 1 tablet by mouth Daily. (Patient not taking: Reported on 5/8/2025), Disp: 90 tablet, Rfl: 2    losartan (COZAAR) 100 MG tablet, Take 1 tablet by mouth Daily. (Patient not taking: Reported on 5/8/2025), Disp: , Rfl:     Omega-3 Fatty Acids (fish oil) 1000 MG capsule capsule, 2 capsule Orally Twice a day 30 days (Patient not taking: Reported on 5/8/2025), Disp: , Rfl:     Ozempic, 0.25 or 0.5 MG/DOSE, 2 MG/3ML solution pen-injector, , Disp: , Rfl:     potassium chloride ER (K-TAB) 20 MEQ  "tablet controlled-release ER tablet, Take 1 tablet by mouth Daily. (Patient not taking: Reported on 5/8/2025), Disp: , Rfl:     simvastatin (ZOCOR) 10 MG tablet, Take 1 tablet by mouth., Disp: , Rfl:     History:  Past Medical History:   Diagnosis Date    Arthritis     Back pain     Diabetes     GERD (gastroesophageal reflux disease)     HTN (hypertension)     Myocardial infarction     Sleep apnea        Past Surgical History:   Procedure Laterality Date    COLONOSCOPY  2012    COLONOSCOPY N/A 5/14/2024    Procedure: COLONOSCOPY WITH POLYPECTOMY;  Surgeon: Dwayne Villeda MD;  Location: [facility] ENDOSCOPY;  Service: Gastroenterology;  Laterality: N/A;    ENDOSCOPY N/A 5/14/2024    Procedure: ESOPHAGOGASTRODUODENOSCOPY WITH BIOPSY;  Surgeon: Dwayne Villeda MD;  Location: [facility] ENDOSCOPY;  Service: Gastroenterology;  Laterality: N/A;    SINUS SURGERY      TOTAL HIP ARTHROPLASTY      RIGHT       Family History   Problem Relation Age of Onset    Breast cancer Mother     Arthritis Mother     Hypertension Father     Ulcers Paternal Grandmother     Diabetes Sister     Hypertension Sister     Colon cancer Neg Hx        Social History     Tobacco Use    Smoking status: Never     Passive exposure: Never    Smokeless tobacco: Never   Vaping Use    Vaping status: Never Used   Substance Use Topics    Alcohol use: No    Drug use: No        OBJECTIVE:    Vital Signs:   Vitals:    05/08/25 1433   BP: 106/58   Pulse: 76   Temp: 97.5 °F (36.4 °C)   TempSrc: Infrared   SpO2: 95%   Weight: 83 kg (183 lb)   Height: 167.6 cm (65.98\")       Physical Exam:       General Appearance:    Alert, cooperative, in no acute distress   Head:    Normocephalic, without obvious abnormality, atraumatic   Eyes:            Normal.  No scleral icterus.  PERRLA    Lungs:     Clear to auscultation,respirations regular, even and                  unlabored    Heart:    Regular rhythm and normal rate,    Abdomen:   Soft and nontender   Extremities:   " Moves all extremities well, no edema, no cyanosis, no             redness   Skin:   No bleeding, bruising or rash   Neurologic:   Normal without gross deficits.   Psychiatric: No evidence of depression or anxiety          Results Review:   I reviewed the patient's new clinical results.    Review of Systems was reviewed and confirmed as accurate as documented by the MA.    ASSESSMENT/PLAN:    1. Tonsillar cancer    2. Dehydration        Patient with tonsillar cancer undergoing treatment now having very poor oral intake due to pain.  I recommend a PEG tube placement.  He understands procedure as well as the risk of bleeding, infection, intestinal injury, leaking of tube feeds etc.  He wishes to proceed          Electronically signed by Dwayne Villeda MD  05/08/25

## 2025-05-08 NOTE — TELEPHONE ENCOUNTER
Ronel stated that Lakisha from [facility] [place] called her to let them know that [facility] will be calling and the Nutritionist will call to set up time.

## 2025-05-12 ENCOUNTER — OUTSIDE FACILITY SERVICE (OUTPATIENT)
Dept: SURGERY | Facility: CLINIC | Age: 65
End: 2025-05-12
Payer: MEDICARE

## 2025-05-15 ENCOUNTER — TELEPHONE (OUTPATIENT)
Dept: SURGERY | Facility: CLINIC | Age: 65
End: 2025-05-15

## 2025-05-15 ENCOUNTER — PREP FOR SURGERY (OUTPATIENT)
Dept: OTHER | Facility: HOSPITAL | Age: 65
End: 2025-05-15
Payer: MEDICARE

## 2025-05-15 DIAGNOSIS — K94.23 MECHANICAL COMPLICATION OF GASTROSTOMY: Primary | ICD-10-CM

## 2025-05-15 NOTE — TELEPHONE ENCOUNTER
Hub staff attempted to follow warm transfer process and was unsuccessful     Caller: Ronel Cruz     Relationship to patient:  SIGNIFICANT OTHER     Best call back number: [phone]     Patient is needing:  FEEDING TUBE IS CLOGGED

## 2025-05-15 NOTE — PRE-PROCEDURE INSTRUCTIONS
PAT phone history completed with patient's sig other for upcoming procedure on 5/16/25 with Dr. Carbone.    PAT PASS reviewed with patient's sig other and they verbalize understanding of the following:     Do not eat or drink anything after midnight the night before procedure unless otherwise instructed by physician/surgeon's office, this includes no gum, candy, mints, tobacco products or e-cigarettes.  Do not shave the area to be operated on at least 48 hours prior to procedure.  Do not wear makeup, lotion, hair products, or nail polish.  Do not wear any jewelry and remove all piercings.  Do not wear any adhesive if you wear dentures.  Do not wear contacts; bring in glasses if needed.  Only take medications on the morning of procedure as instructed by PAT nurse per anesthesia guidelines or as instructed by physician's office.  If you are on any blood thinners reach out to the physician/surgeon's office for instructions on when/if they will need to be stopped prior to procedure.  Bring in picture ID and insurance card, advanced directive copies if applicable, CPAP/BIPAP/Inhalers if indicated morning of procedure, leave any other valuables at home.  Ensure you have arranged for someone to drive you home the day of your procedure and someone to care for you at home afterwards. It is recommended that you do not drive, drink alcohol, or make any major legal decisions for at least 24 hours after your procedure is complete.  ERAS instructions given unless otherwise instructed per surgeon's orders.    Instructions given on hospital entrance and registration location.

## 2025-05-16 ENCOUNTER — HOSPITAL ENCOUNTER (OUTPATIENT)
Facility: HOSPITAL | Age: 65
Setting detail: HOSPITAL OUTPATIENT SURGERY
Discharge: HOME | End: 2025-05-16
Attending: SURGERY | Admitting: SURGERY
Payer: MEDICARE

## 2025-05-16 ENCOUNTER — ANESTHESIA EVENT (OUTPATIENT)
Dept: GASTROENTEROLOGY | Facility: HOSPITAL | Age: 65
End: 2025-05-16
Payer: MEDICARE

## 2025-05-16 ENCOUNTER — ANESTHESIA (OUTPATIENT)
Dept: GASTROENTEROLOGY | Facility: HOSPITAL | Age: 65
End: 2025-05-16
Payer: MEDICARE

## 2025-05-16 VITALS
DIASTOLIC BLOOD PRESSURE: 69 MMHG | TEMPERATURE: 97.8 F | SYSTOLIC BLOOD PRESSURE: 123 MMHG | OXYGEN SATURATION: 95 % | WEIGHT: 175 LBS | HEART RATE: 60 BPM | BODY MASS INDEX: 28.12 KG/M2 | HEIGHT: 66 IN | RESPIRATION RATE: 18 BRPM

## 2025-05-16 LAB — GLUCOSE BLDC GLUCOMTR-MCNC: 158 MG/DL (ref 70–130)

## 2025-05-16 PROCEDURE — C1769 GUIDE WIRE: HCPCS | Performed by: SURGERY

## 2025-05-16 PROCEDURE — 25010000002 PROPOFOL 200 MG/20ML EMULSION: Performed by: NURSE ANESTHETIST, CERTIFIED REGISTERED

## 2025-05-16 PROCEDURE — 82948 REAGENT STRIP/BLOOD GLUCOSE: CPT

## 2025-05-16 PROCEDURE — C1894 INTRO/SHEATH, NON-LASER: HCPCS | Performed by: SURGERY

## 2025-05-16 PROCEDURE — 43246 EGD PLACE GASTROSTOMY TUBE: CPT | Performed by: SURGERY

## 2025-05-16 RX ORDER — PROPOFOL 10 MG/ML
INJECTION, EMULSION INTRAVENOUS AS NEEDED
Status: DISCONTINUED | OUTPATIENT
Start: 2025-05-16 | End: 2025-05-16 | Stop reason: SURG

## 2025-05-16 RX ORDER — LIDOCAINE HCL/PF 100 MG/5ML
SYRINGE (ML) INJECTION AS NEEDED
Status: DISCONTINUED | OUTPATIENT
Start: 2025-05-16 | End: 2025-05-16 | Stop reason: SURG

## 2025-05-16 RX ORDER — ONDANSETRON 2 MG/ML
4 INJECTION INTRAMUSCULAR; INTRAVENOUS ONCE AS NEEDED
Status: DISCONTINUED | OUTPATIENT
Start: 2025-05-16 | End: 2025-05-16 | Stop reason: HOSPADM

## 2025-05-16 RX ADMIN — PROPOFOL 50 MG: 10 INJECTION, EMULSION INTRAVENOUS at 09:56

## 2025-05-16 RX ADMIN — Medication 75 MG: at 09:50

## 2025-05-16 RX ADMIN — PROPOFOL 50 MG: 10 INJECTION, EMULSION INTRAVENOUS at 09:52

## 2025-05-16 NOTE — DISCHARGE INSTRUCTIONS
No pushing, pulling, tugging,  heavy lifting, or strenuous activity.  No major decision making, driving, or drinking alcoholic beverages for 24 hours. ( due to the medications you have  received)  Always use good hand hygiene/washing techniques.  NO driving while taking pain medications.

## 2025-05-16 NOTE — ANESTHESIA POSTPROCEDURE EVALUATION
Patient: Alex Aguilera Jr.    Procedure Summary       Date: 05/16/25 Room / Location: [facility] ENDOSCOPY 3 / [facility] ENDOSCOPY    Anesthesia Start: 0949 Anesthesia Stop: 1002    Procedure: ESOPHAGOGASTRODUODENOSCOPY with gastrostomy tube exchange (Esophagus) Diagnosis:       Mechanical complication of gastrostomy      (Mechanical complication of gastrostomy [K94.23])    Surgeons: Cierra Carbone MD Provider: Gama Muse CRNA    Anesthesia Type: MAC ASA Status: 3            Anesthesia Type: MAC    Vitals  No vitals data found for the desired time range.          Post Anesthesia Care and Evaluation    Patient location during evaluation: bedside  Patient participation: complete - patient participated  Level of consciousness: awake and alert  Pain score: 0  Pain management: adequate    Airway patency: patent  Anesthetic complications: No anesthetic complications  PONV Status: none  Cardiovascular status: acceptable  Respiratory status: acceptable  Hydration status: acceptable

## 2025-05-16 NOTE — ANESTHESIA PREPROCEDURE EVALUATION
Anesthesia Evaluation     Patient summary reviewed and Nursing notes reviewed   NPO Solid Status: > 8 hours  NPO Liquid Status: > 8 hours           Airway   Mallampati: II  TM distance: >3 FB  Neck ROM: full  Possible difficult intubation  Dental      Pulmonary    (+) ,sleep apnea  Cardiovascular     (+) hypertension, past MI       Neuro/Psych  GI/Hepatic/Renal/Endo    (+) GERD, diabetes mellitus    Musculoskeletal     (+) back pain  Abdominal    Substance History      OB/GYN          Other   arthritis,   history of cancer                Anesthesia Plan    ASA 3     MAC     intravenous induction     Anesthetic plan, risks, benefits, and alternatives have been provided, discussed and informed consent has been obtained with: patient.  Pre-procedure education provided  Plan discussed with CRNA.    CODE STATUS:

## 2025-05-16 NOTE — OP NOTE
PATIENT:    Alex Aguilera Jr.    DATE OF SURGERY:  5/16/2025    PHYSICIAN:    Cierra Carbone MD    REFERRING PHYSICIAN:  Cierra Carbone MD    YOB: 1960    PREOPERATIVE DIAGNOSIS: Clogged G-tube    POSTOPERATIVE DIAGNOSIS: Same    PROCEDURE: EGD with insertion PEG tube        Specimen: None    EBL: None    Complications: None    INDICATIONS:  The patient was sent to me as a consultation by Cierra Carbone MD for evaluation and treatment of a history significant for clotted G-tube requiring exchange and placement of new tube.  It was placed less than 4 days ago.. They are here now today for insertion of PEG tube    ANESTHESIA: Sedation with local anesthesia     OPERATIVE PROCEDURE:  The patient was taken to the operating room, placed in the supine position, and given sedation with local anesthesia.  They were prepped and draped in the normal sterile fashion.  They did receive preoperative IV antibiotics.  The nursing staff did perform intraoperative timeout prior to the incision.    EGD inserted without difficulty in the stomach.  The old G-tube site was identified.  The balloon was decompressed and the G-tube removed.  I then placed the guidewire through an opening in the abdominal wall and placed an 18 [place] G-tube over the guidewire.  He was placed in normal position in good position.  The endoscope was removed.  A dressing applied over the G-tube.  No complications.    The patient was stable at this point in time and subsequently transferred back to the recovery room in stable condition.       Cierra Carbone MD  5/16/2025  10:03 EDT

## 2025-06-19 ENCOUNTER — TELEPHONE (OUTPATIENT)
Dept: SURGERY | Facility: CLINIC | Age: 65
End: 2025-06-19

## 2025-06-19 NOTE — PROGRESS NOTES
"Patient: Alex Aguilera Jr.  YOB: 1960    Date: 06/20/2025    Primary Care Provider: Lee Arriola MD    Chief Complaint   Patient presents with    Follow-up     Loss of button on PEG tube       History: The patient is in the office today in follow up from PEG tube placement.  Patient states that a button has fallen off the PEG tube and they are concerned.  No drainage or redness around the G-tube site    The following portions of the patient's history were reviewed and updated as appropriate: allergies, current medications, past family history, past medical history, past social history, past surgical history and problem list.    Review of Systems    Vital Signs  Vitals:    06/20/25 1233   BP: (!) 162/102   Pulse: 93   Temp: 98.3 °F (36.8 °C)   TempSrc: Infrared   SpO2: 95%   Weight: 79.4 kg (175 lb 0.7 oz)   Height: 167.6 cm (65.98\")       Allergies:  Allergies   Allergen Reactions    Codeine Nausea And Vomiting    Latex Other (See Comments)     Skin irritation       Medications:    Current Outpatient Medications:     Cipro 500 MG/5ML (10%) suspension, take ONE TEASPOONFUL TWICE DAILY, Disp: , Rfl:     ciprofloxacin (CIPRO) 500 MG tablet, Take 1 tablet by mouth 2 (Two) Times a Day., Disp: , Rfl:     fentaNYL (DURAGESIC) 25 MCG/HR patch, PLACE ONE PATCH ON THE SKIN EVERY THREE DAYS AND LEAVE ON FOR 72 HOURS - REMOVE OLD PATCH BEFORE APPLYING NEW ONE, Disp: , Rfl:     hydrOXYzine (ATARAX) 25 MG tablet, TAKE ONE TABLET AS NEEDED TWICE DAILY, Disp: , Rfl:     Nutritional Supplements (Glucerna 1.5 Gamal) liquid, 237 ml 6.5 x day, Disp: , Rfl:     oxyCODONE (ROXICODONE) 10 MG tablet, Take 1 tablet by mouth., Disp: , Rfl:     silver sulfadiazine (SILVADENE, SSD) 1 % cream, Apply to affected skin 2 times a day as needed, Disp: , Rfl:     amLODIPine (NORVASC) 10 MG tablet, Take 1 tablet by mouth Daily., Disp: , Rfl:     aspirin 81 MG EC tablet, Take 1 tablet by mouth Daily., Disp: , Rfl:     BD " ULTRA-FINE PEN NEEDLES 29G X 12.7MM misc, , Disp: , Rfl:     bisacodyl (Dulcolax) 5 MG EC tablet, Use as directed in bowel prep instructions., Disp: 4 tablet, Rfl: 0    carvedilol (COREG) 25 MG tablet, 1 tablet with food Orally Twice a day, Disp: , Rfl:     cetirizine (zyrTEC) 10 MG tablet, Take 1 tablet by mouth Daily., Disp: , Rfl:     cholecalciferol (VITAMIN D3) 1.25 MG (22845 UT) capsule, Take 1 capsule by mouth Every 7 (Seven) Days., Disp: , Rfl:     Cholecalciferol 10 MCG (400 UNIT) tablet, Take 1 tablet by mouth Daily., Disp: , Rfl:     cloNIDine (CATAPRES) 0.1 MG tablet, Take 1 tablet by mouth., Disp: , Rfl:     Continuous Blood Gluc  (FreeStyle Alexandr 2 [place]) device, 1 kit Continuous., Disp: 1 each, Rfl: 0    Continuous Blood Gluc Sensor (FreeStyle Alexandr 2 Sensor) misc, 1 each Every 14 (Fourteen) Days., Disp: 6 each, Rfl: 3    dapagliflozin Propanediol (Farxiga) 10 MG tablet, Take 10 mg by mouth Daily., Disp: 90 tablet, Rfl: 1    Dermatological Products, Misc. (Strata xrt) gel, Apply a thin layer once or twice a day as needed (Patient not taking: Reported on 5/15/2025), Disp: , Rfl:     dexAMETHasone (DECADRON) 4 MG tablet, Take 1 tablet by mouth Daily With Breakfast., Disp: , Rfl:     diazePAM (Valium) 5 MG tablet, Take 1 tablet by mouth 30 Min Pre-Op for 1 dose. Before MRI (Patient not taking: Reported on 5/15/2025), Disp: 2 tablet, Rfl: 0    diclofenac (VOLTAREN) 75 MG EC tablet, 1 tab Orally Twice a day (Patient not taking: Reported on 5/15/2025), Disp: , Rfl:     Diclofenac Sodium (VOLTAREN) 1 % gel gel, APPLY TO AFFECTED AREA FOUR TIMES DAILY (Patient not taking: Reported on 5/15/2025), Disp: , Rfl:     dicyclomine (BENTYL) 20 MG tablet, 1 tablet Orally Every 12 hr, Disp: , Rfl:     Easy Comfort Pen Needles 31G X 8 MM misc, , Disp: , Rfl:     Evolocumab (Repatha SureClick) solution auto-injector SureClick injection, Inject 1 mL under the skin into the appropriate area as directed. (Patient  not taking: Reported on 5/8/2025), Disp: , Rfl:     Fluconazole (DIFLUCAN PO), , Disp: , Rfl:     fluticasone (FLONASE) 50 MCG/ACT nasal spray, 1 spray in each nostril Nasally Once a day 30 day(s) (Patient not taking: Reported on 5/8/2025), Disp: , Rfl:     furosemide (LASIX) 20 MG tablet, Take 1 tablet by mouth Daily. (Patient not taking: Reported on 5/8/2025), Disp: , Rfl:     gabapentin (NEURONTIN) 300 MG capsule, Take 1 capsule by mouth Every Night., Disp: , Rfl:     Glucagon (Gvoke HypoPen 2-Pack) 1 MG/0.2ML solution auto-injector, as directed Subcutaneous as needed 30 days, Disp: , Rfl:     HM Antacid Anti-Gas Ex St 400-400-40 MG/5ML suspension, , Disp: , Rfl:     hydrALAZINE (APRESOLINE) 25 MG tablet, Take 1 tablet by mouth 2 (Two) Times a Day., Disp: , Rfl:     HYDROcodone-acetaminophen (NORCO) 5-325 MG per tablet, Take 1 tablet every 6 hours by oral route for 3 days. (Patient not taking: Reported on 5/15/2025), Disp: , Rfl:     irbesartan (AVAPRO) 300 MG tablet, Take 1 tablet by mouth Daily. (Patient not taking: Reported on 5/8/2025), Disp: , Rfl:     isosorbide mononitrate (IMDUR) 60 MG 24 hr tablet, 1 tablet in the morning orally once a day, Disp: , Rfl:     Januvia 100 MG tablet, Take 1 tablet by mouth Daily. (Patient not taking: Reported on 5/8/2025), Disp: 90 tablet, Rfl: 2    Lidocaine Viscous HCl (XYLOCAINE) 2 % solution, SWISH 5ML IN MOUTH AS NEEDED FOR THROAT PAIN, Disp: , Rfl:     LORazepam (ATIVAN) 0.5 MG tablet, Take 1 tablet by mouth At Night As Needed., Disp: , Rfl:     losartan (COZAAR) 100 MG tablet, Take 1 tablet by mouth Daily. (Patient not taking: Reported on 5/8/2025), Disp: , Rfl:     mometasone (ELOCON) 0.1 % cream, Apply  topically to the appropriate area as directed Daily., Disp: , Rfl:     naloxone (NARCAN) 4 MG/0.1ML nasal spray, Administer 1 spray into the nostril(s) as directed by provider., Disp: , Rfl:     nitroglycerin (NITROSTAT) 0.4 MG SL tablet, , Disp: , Rfl:     Omega-3  Fatty Acids (fish oil) 1000 MG capsule capsule, 2 capsule Orally Twice a day 30 days (Patient not taking: Reported on 5/8/2025), Disp: , Rfl:     omeprazole (priLOSEC) 40 MG capsule, 1 capsule Orally Once a day, Disp: , Rfl:     ondansetron (ZOFRAN) 8 MG tablet, Take 1 tablet by mouth., Disp: , Rfl:     ondansetron ODT (ZOFRAN-ODT) 4 MG disintegrating tablet, Place 1 tablet on the tongue Every 8 (Eight) Hours As Needed., Disp: , Rfl:     Ozempic, 0.25 or 0.5 MG/DOSE, 2 MG/3ML solution pen-injector, , Disp: , Rfl:     polyethylene glycol (MiraLax) 17 GM/SCOOP powder, Mix 238g powder with 64 oz of clear liquid. Use as directed, Disp: 238 g, Rfl: 0    potassium chloride (K-DUR,KLOR-CON) 20 MEQ CR tablet, 2 tablets with food Orally Once a day, Disp: , Rfl:     potassium chloride ER (K-TAB) 20 MEQ tablet controlled-release ER tablet, Take 1 tablet by mouth Daily. (Patient not taking: Reported on 5/8/2025), Disp: , Rfl:     Praluent 75 MG/ML solution auto-injector, Inject 1 mL every 2 weeks by subcutaneous route., Disp: , Rfl:     prochlorperazine (COMPAZINE) 10 MG tablet, Take 1 tablet by mouth Every 6 (Six) Hours As Needed., Disp: , Rfl:     promethazine (PHENERGAN) 25 MG tablet, TAKE ONE-HALF TABLET THREE TIMES DAILY AS NEEDED FOR NAUSEA/VOMITNG, Disp: , Rfl:     simvastatin (ZOCOR) 10 MG tablet, Take 1 tablet by mouth., Disp: , Rfl:     Sodium Fluoride 1.1 % gel, USE AT night time, instead of your regular toothpast, Disp: , Rfl:     sucralfate (CARAFATE) 1 g tablet, Take 1 tablet by mouth., Disp: , Rfl:     tamsulosin (FLOMAX) 0.4 MG capsule 24 hr capsule, 1 capsule Daily., Disp: , Rfl:     traMADol (ULTRAM) 50 MG tablet, Take 1 tablet by mouth 2 (Two) Times a Day., Disp: , Rfl:     triamcinolone (KENALOG) 0.1 % cream, 1 application to affected area Externally Twice a day 10 days (Patient not taking: Reported on 5/15/2025), Disp: , Rfl:     valsartan (DIOVAN) 320 MG tablet, Take 1 tablet by mouth Daily., Disp: , Rfl:      Physical Exam:    Abdomen-G-tube site looks good, no redness or drainage.  Stay sutures still in place     Results Review:   I reviewed the patient's new clinical results.     Review of Systems was reviewed and confirmed as accurate as documented by the MA.    ASSESSMENT/PLAN:    1. Attention to G-tube       Stay suture removed, dressing applied, follow-up as needed continue use G-tube as needed    Electronically signed by Cierra Carbone MD  06/20/25

## 2025-06-19 NOTE — TELEPHONE ENCOUNTER
Hub staff attempted to follow warm transfer process and was unsuccessful     Caller: KARIN ON VERBAL    Relationship to patient: SIGN OTHER    Best call back number: [phone]    Patient is needing: KARIN SAID ONE OF THE 2 BUTTONS ON FEEDING TUBE NEXT TO INCISION HAS FALLING OFF. SHE DOESN'T KNOW WHAT TO DO. PLEASE CALL PT.    PT HAD ESOPHAGOGASTRODUODENOSCOPY with gastrostomy tube exchange  5-16--25.”

## 2025-06-20 ENCOUNTER — OFFICE VISIT (OUTPATIENT)
Dept: SURGERY | Facility: CLINIC | Age: 65
End: 2025-06-20
Payer: MEDICARE

## 2025-06-20 VITALS
BODY MASS INDEX: 28.13 KG/M2 | TEMPERATURE: 98.3 F | DIASTOLIC BLOOD PRESSURE: 102 MMHG | SYSTOLIC BLOOD PRESSURE: 162 MMHG | HEART RATE: 93 BPM | WEIGHT: 175.04 LBS | OXYGEN SATURATION: 95 % | HEIGHT: 66 IN

## 2025-06-20 DIAGNOSIS — Z43.1 ATTENTION TO G-TUBE: Primary | ICD-10-CM

## 2025-06-20 PROCEDURE — 1159F MED LIST DOCD IN RCRD: CPT | Performed by: SURGERY

## 2025-06-20 PROCEDURE — 99212 OFFICE O/P EST SF 10 MIN: CPT | Performed by: SURGERY

## 2025-06-20 PROCEDURE — 1160F RVW MEDS BY RX/DR IN RCRD: CPT | Performed by: SURGERY

## 2025-06-20 RX ORDER — HYDROXYZINE HYDROCHLORIDE 25 MG/1
TABLET, FILM COATED ORAL
COMMUNITY
Start: 2025-05-13

## 2025-06-20 RX ORDER — CIPROFLOXACIN 500 MG/1
500 TABLET, FILM COATED ORAL 2 TIMES DAILY
COMMUNITY
Start: 2025-06-02

## 2025-06-20 RX ORDER — FENTANYL 25 UG/1
PATCH TRANSDERMAL
COMMUNITY
Start: 2025-05-15

## 2025-06-20 RX ORDER — SILVER SULFADIAZINE 10 MG/G
CREAM TOPICAL
COMMUNITY
Start: 2025-06-02

## 2025-06-20 RX ORDER — INFANT FORM.IRON LAC-F/DHA/ARA 3.1 G/1
POWDER (GRAM) ORAL
COMMUNITY
Start: 2025-05-09

## 2025-06-20 RX ORDER — CIPROFLOXACIN 500 MG/5ML
KIT ORAL
COMMUNITY
Start: 2025-06-04

## 2025-06-20 RX ORDER — OXYCODONE HYDROCHLORIDE 10 MG/1
10 TABLET ORAL
COMMUNITY
Start: 2025-06-02

## 2025-07-11 ENCOUNTER — TRANSCRIBE ORDERS (OUTPATIENT)
Dept: ADMINISTRATIVE | Facility: HOSPITAL | Age: 65
End: 2025-07-11
Payer: MEDICARE

## 2025-07-11 DIAGNOSIS — C09.9 SQUAMOUS CELL CARCINOMA OF LEFT TONSIL: Primary | ICD-10-CM

## 2025-07-25 ENCOUNTER — TELEPHONE (OUTPATIENT)
Dept: SURGERY | Facility: CLINIC | Age: 65
End: 2025-07-25

## (undated) DEVICE — ENDOSCOPY PORT CONNECTOR FOR OLYMPUS® SCOPES: Brand: ERBE

## (undated) DEVICE — 3M™ MICROFOAM™ TAPE 1528-4: Brand: 3M™ MICROFOAM™

## (undated) DEVICE — FRCP BX RADJAW4 NDL 2.8 240 STD OG

## (undated) DEVICE — NITINOL WIRE WITH HYDROPHILIC TIP: Brand: SENSOR

## (undated) DEVICE — NDL HYPO ECLPS SFTY 25G 1 1/2IN

## (undated) DEVICE — GAUZE,SPONGE,4"X4",16PLY,XRAY,STRL,LF: Brand: MEDLINE

## (undated) DEVICE — BANDAGE,GAUZE,BULKEE II,4.5"X4.1YD,STRL: Brand: MEDLINE

## (undated) DEVICE — QUICK CATCH IN-LINE SUCTION POLYP TRAP IS USED FOR SUCTION RETRIEVAL OF ENDOSCOPICALLY REMOVED POLYPS.

## (undated) DEVICE — VLV SXN AIR/H2O ORCAPOD3 1P/U STRL

## (undated) DEVICE — MEDI-VAC NON-CONDUCTIVE SUCTION TUBING: Brand: CARDINAL HEALTH

## (undated) DEVICE — BLD CLIP FLT UNIV DISP GRY

## (undated) DEVICE — Device

## (undated) DEVICE — LUBE JELLY PK/2.75GM STRL BX/144

## (undated) DEVICE — KT TBG FEED MIC GASTRO ENFIT REC/DIST/TP 18F

## (undated) DEVICE — HYBRID CO2 TUBING/CAP SET FOR OLYMPUS® SCOPES & CO2 SOURCE: Brand: ERBE

## (undated) DEVICE — COUNT NDL FOAM STRIP W/MAG 60CT

## (undated) DEVICE — PACK,SET UP,NO DRAPES: Brand: MEDLINE

## (undated) DEVICE — SYR LL TP 10ML STRL

## (undated) DEVICE — TOWEL,OR,DSP,ST,BLUE,STD,4/PK,20PK/CS: Brand: MEDLINE

## (undated) DEVICE — SUCTION CANISTER, 1500CC, RIGID: Brand: DEROYAL

## (undated) DEVICE — SPNG GZ WOVN 4X4IN 12PLY 10/BX STRL

## (undated) DEVICE — SHEET,DRAPE,70X100,STERILE: Brand: MEDLINE

## (undated) DEVICE — MARKR SKIN W/RULR

## (undated) DEVICE — CONMED SCOPE SAVER BITE BLOCK, 20X27 MM: Brand: SCOPE SAVER

## (undated) DEVICE — TB FEED MIC G STD 18F 7 TO 10ML

## (undated) DEVICE — INTRODUCER KIT FOR GASTROSTOMY FEEDING TUBE: Brand: AVANOS

## (undated) DEVICE — ENTERAL ACCESS DILATION SYSTEM: Brand: AVANOS

## (undated) DEVICE — SYR LUER SLPTP 50ML

## (undated) DEVICE — ENTERAL SYRINGE: Brand: MONOJECT

## (undated) DEVICE — SNAR POLYP CAPTIVATOR2 RND STFF 2.4 10MM 240CM

## (undated) DEVICE — CANISTER, RIGID, 2000CC: Brand: MEDLINE INDUSTRIES, INC.

## (undated) DEVICE — PATIENT RETURN ELECTRODE, SINGLE-USE, CONTACT QUALITY MONITORING, ADULT, WITH 9FT CORD, FOR PATIENTS WEIGING OVER 33LBS. (15KG): Brand: MEGADYNE

## (undated) DEVICE — APPL CHLORAPREP HI/LITE 26ML ORNG